# Patient Record
Sex: FEMALE | Race: BLACK OR AFRICAN AMERICAN | NOT HISPANIC OR LATINO | Employment: UNEMPLOYED | URBAN - METROPOLITAN AREA
[De-identification: names, ages, dates, MRNs, and addresses within clinical notes are randomized per-mention and may not be internally consistent; named-entity substitution may affect disease eponyms.]

---

## 2018-02-21 ENCOUNTER — OFFICE VISIT (OUTPATIENT)
Dept: OBSTETRICS AND GYNECOLOGY | Facility: CLINIC | Age: 38
End: 2018-02-21

## 2018-02-21 VITALS
WEIGHT: 122 LBS | SYSTOLIC BLOOD PRESSURE: 128 MMHG | DIASTOLIC BLOOD PRESSURE: 91 MMHG | HEIGHT: 67 IN | HEART RATE: 68 BPM | BODY MASS INDEX: 19.15 KG/M2

## 2018-02-21 DIAGNOSIS — E04.9 GOITER: ICD-10-CM

## 2018-02-21 DIAGNOSIS — Z72.0 TOBACCO USE: ICD-10-CM

## 2018-02-21 DIAGNOSIS — Z01.419 ENCOUNTER FOR GYNECOLOGICAL EXAMINATION WITHOUT ABNORMAL FINDING: Primary | ICD-10-CM

## 2018-02-21 PROCEDURE — 99406 BEHAV CHNG SMOKING 3-10 MIN: CPT | Performed by: OBSTETRICS & GYNECOLOGY

## 2018-02-21 PROCEDURE — 99213 OFFICE O/P EST LOW 20 MIN: CPT | Performed by: OBSTETRICS & GYNECOLOGY

## 2018-02-21 PROCEDURE — 99395 PREV VISIT EST AGE 18-39: CPT | Performed by: OBSTETRICS & GYNECOLOGY

## 2018-02-21 RX ORDER — ALBUTEROL SULFATE 90 UG/1
2 AEROSOL, METERED RESPIRATORY (INHALATION)
COMMUNITY
End: 2019-05-20

## 2018-02-21 RX ORDER — AMLODIPINE BESYLATE 10 MG/1
TABLET ORAL
Refills: 3 | COMMUNITY
Start: 2018-01-11 | End: 2019-05-20

## 2018-02-21 RX ORDER — ZOLPIDEM TARTRATE 10 MG/1
10 TABLET ORAL
COMMUNITY
End: 2019-05-20

## 2018-02-21 RX ORDER — IBUPROFEN 800 MG/1
800 TABLET ORAL
COMMUNITY
Start: 2017-10-30 | End: 2018-10-03

## 2018-02-21 RX ORDER — DULOXETIN HYDROCHLORIDE 20 MG/1
CAPSULE, DELAYED RELEASE ORAL
Refills: 2 | COMMUNITY
Start: 2018-01-09 | End: 2019-05-20

## 2018-02-21 RX ORDER — OMEPRAZOLE 40 MG/1
CAPSULE, DELAYED RELEASE ORAL
Refills: 0 | COMMUNITY
Start: 2018-01-30 | End: 2019-05-20

## 2018-02-21 RX ORDER — OMEPRAZOLE 20 MG/1
20 CAPSULE, DELAYED RELEASE ORAL
COMMUNITY
End: 2018-02-21

## 2018-02-21 RX ORDER — ATORVASTATIN CALCIUM 20 MG/1
20 TABLET, FILM COATED ORAL
COMMUNITY
End: 2019-05-20

## 2018-02-21 RX ORDER — BUPROPION HYDROCHLORIDE 150 MG/1
150 TABLET, EXTENDED RELEASE ORAL
COMMUNITY
End: 2020-10-14

## 2018-02-21 RX ORDER — BUDESONIDE AND FORMOTEROL FUMARATE DIHYDRATE 160; 4.5 UG/1; UG/1
2 AEROSOL RESPIRATORY (INHALATION)
COMMUNITY
Start: 2015-12-21 | End: 2019-06-04

## 2018-02-21 RX ORDER — CYCLOBENZAPRINE HCL 10 MG
10 TABLET ORAL AS NEEDED
COMMUNITY
Start: 2017-10-30 | End: 2020-04-29

## 2018-02-21 RX ORDER — LISINOPRIL 40 MG/1
TABLET ORAL
Refills: 3 | COMMUNITY
Start: 2018-01-11 | End: 2019-05-20

## 2018-02-21 RX ORDER — DICYCLOMINE HYDROCHLORIDE 10 MG/1
CAPSULE ORAL
Refills: 2 | COMMUNITY
Start: 2018-01-11 | End: 2019-05-20

## 2018-02-21 RX ORDER — MONTELUKAST SODIUM 10 MG/1
10 TABLET ORAL
COMMUNITY
End: 2018-04-18 | Stop reason: SDUPTHER

## 2018-02-21 RX ORDER — MULTIVIT-MIN/IRON/FOLIC ACID/K 18-600-40
CAPSULE ORAL
COMMUNITY
End: 2018-10-03 | Stop reason: SDUPTHER

## 2018-02-21 NOTE — PATIENT INSTRUCTIONS
Steps to Quit Smoking  Smoking tobacco can be harmful to your health and can affect almost every organ in your body. Smoking puts you, and those around you, at risk for developing many serious chronic diseases. Quitting smoking is difficult, but it is one of the best things that you can do for your health. It is never too late to quit.  What are the benefits of quitting smoking?  When you quit smoking, you lower your risk of developing serious diseases and conditions, such as:  · Lung cancer or lung disease, such as COPD.  · Heart disease.  · Stroke.  · Heart attack.  · Infertility.  · Osteoporosis and bone fractures.  Additionally, symptoms such as coughing, wheezing, and shortness of breath may get better when you quit. You may also find that you get sick less often because your body is stronger at fighting off colds and infections. If you are pregnant, quitting smoking can help to reduce your chances of having a baby of low birth weight.  How do I get ready to quit?  When you decide to quit smoking, create a plan to make sure that you are successful. Before you quit:  · Pick a date to quit. Set a date within the next two weeks to give you time to prepare.  · Write down the reasons why you are quitting. Keep this list in places where you will see it often, such as on your bathroom mirror or in your car or wallet.  · Identify the people, places, things, and activities that make you want to smoke (triggers) and avoid them. Make sure to take these actions:  ¨ Throw away all cigarettes at home, at work, and in your car.  ¨ Throw away smoking accessories, such as ashtrays and lighters.  ¨ Clean your car and make sure to empty the ashtray.  ¨ Clean your home, including curtains and carpets.  · Tell your family, friends, and coworkers that you are quitting. Support from your loved ones can make quitting easier.  · Talk with your health care provider about your options for quitting smoking.  · Find out what treatment  options are covered by your health insurance.  What strategies can I use to quit smoking?  Talk with your healthcare provider about different strategies to quit smoking. Some strategies include:  · Quitting smoking altogether instead of gradually lessening how much you smoke over a period of time. Research shows that quitting “cold turkey” is more successful than gradually quitting.  · Attending in-person counseling to help you build problem-solving skills. You are more likely to have success in quitting if you attend several counseling sessions. Even short sessions of 10 minutes can be effective.  · Finding resources and support systems that can help you to quit smoking and remain smoke-free after you quit. These resources are most helpful when you use them often. They can include:  ¨ Online chats with a counselor.  ¨ Telephone quitlines.  ¨ Printed self-help materials.  ¨ Support groups or group counseling.  ¨ Text messaging programs.  ¨ Mobile phone applications.  · Taking medicines to help you quit smoking. (If you are pregnant or breastfeeding, talk with your health care provider first.) Some medicines contain nicotine and some do not. Both types of medicines help with cravings, but the medicines that include nicotine help to relieve withdrawal symptoms. Your health care provider may recommend:  ¨ Nicotine patches, gum, or lozenges.  ¨ Nicotine inhalers or sprays.  ¨ Non-nicotine medicine that is taken by mouth.  Talk with your health care provider about combining strategies, such as taking medicines while you are also receiving in-person counseling. Using these two strategies together makes you more likely to succeed in quitting than if you used either strategy on its own.  If you are pregnant or breastfeeding, talk with your health care provider about finding counseling or other support strategies to quit smoking. Do not take medicine to help you quit smoking unless told to do so by your health care  provider.  What things can I do to make it easier to quit?  Quitting smoking might feel overwhelming at first, but there is a lot that you can do to make it easier. Take these important actions:  · Reach out to your family and friends and ask that they support and encourage you during this time. Call telephone quitlines, reach out to support groups, or work with a counselor for support.  · Ask people who smoke to avoid smoking around you.  · Avoid places that trigger you to smoke, such as bars, parties, or smoke-break areas at work.  · Spend time around people who do not smoke.  · Lessen stress in your life, because stress can be a smoking trigger for some people. To lessen stress, try:  ¨ Exercising regularly.  ¨ Deep-breathing exercises.  ¨ Yoga.  ¨ Meditating.  ¨ Performing a body scan. This involves closing your eyes, scanning your body from head to toe, and noticing which parts of your body are particularly tense. Purposefully relax the muscles in those areas.  · Download or purchase mobile phone or tablet apps (applications) that can help you stick to your quit plan by providing reminders, tips, and encouragement. There are many free apps, such as QuitGuide from the CDC (Centers for Disease Control and Prevention). You can find other support for quitting smoking (smoking cessation) through smokefree.gov and other websites.  How will I feel when I quit smoking?  Within the first 24 hours of quitting smoking, you may start to feel some withdrawal symptoms. These symptoms are usually most noticeable 2-3 days after quitting, but they usually do not last beyond 2-3 weeks. Changes or symptoms that you might experience include:  · Mood swings.  · Restlessness, anxiety, or irritation.  · Difficulty concentrating.  · Dizziness.  · Strong cravings for sugary foods in addition to nicotine.  · Mild weight gain.  · Constipation.  · Nausea.  · Coughing or a sore throat.  · Changes in how your medicines work in your  body.  · A depressed mood.  · Difficulty sleeping (insomnia).  After the first 2-3 weeks of quitting, you may start to notice more positive results, such as:  · Improved sense of smell and taste.  · Decreased coughing and sore throat.  · Slower heart rate.  · Lower blood pressure.  · Clearer skin.  · The ability to breathe more easily.  · Fewer sick days.  Quitting smoking is very challenging for most people. Do not get discouraged if you are not successful the first time. Some people need to make many attempts to quit before they achieve long-term success. Do your best to stick to your quit plan, and talk with your health care provider if you have any questions or concerns.  This information is not intended to replace advice given to you by your health care provider. Make sure you discuss any questions you have with your health care provider.  Document Released: 12/12/2002 Document Revised: 08/15/2017 Document Reviewed: 05/03/2016  [employer] Interactive Patient Education © 2017 [employer]

## 2018-02-21 NOTE — PROGRESS NOTES
GYN Annual Exam     CC- Here for annual exam.   Also concerned with swelling in throat     Belem Perez is a 37 y.o. female who presents for annual well woman exam. Periods are regular every 28-30 days, lasting 7 days. Dysmenorrhea:severe, occurring first 1-2 days of flow. Cyclic symptoms include headache and nausea and vomiting. . No intermenstrual bleeding, spotting, or discharge.    Noted swelling in her throat that has slowly worsened in the last few years.      OB History      Para Term  AB Living    2 2    2    SAB TAB Ectopic Multiple Live Births                  Current contraception: tubal ligation  History of abnormal Pap smear: yes - no treatment required.   Family history of uterine, colon or ovarian cancer: no  History of abnormal mammogram: no  Family history of breast cancer: no  Last Pap : 2014 NL HPV-    Past Medical History:   Diagnosis Date   • Asthma    • Bipolar disorder    • GERD (gastroesophageal reflux disease)    • Hyperlipidemia    • Hypertension        Past Surgical History:   Procedure Laterality Date   • APPENDECTOMY     • TUBAL ABDOMINAL LIGATION           Current Outpatient Prescriptions:   •  albuterol (PROVENTIL HFA;VENTOLIN HFA) 108 (90 Base) MCG/ACT inhaler, Inhale 2 puffs., Disp: , Rfl:   •  amLODIPine (NORVASC) 10 MG tablet, TK 1 T PO  D, Disp: , Rfl: 3  •  atorvastatin (LIPITOR) 20 MG tablet, Take 20 mg by mouth., Disp: , Rfl:   •  budesonide-formoterol (SYMBICORT) 160-4.5 MCG/ACT inhaler, Inhale 2 puffs., Disp: , Rfl:   •  buPROPion SR (WELLBUTRIN SR) 150 MG 12 hr tablet, Take 150 mg by mouth., Disp: , Rfl:   •  Cholecalciferol (VITAMIN D) 2000 units capsule, Take  by mouth., Disp: , Rfl:   •  cyclobenzaprine (FLEXERIL) 10 MG tablet, Take 10 mg by mouth., Disp: , Rfl:   •  dicyclomine (BENTYL) 10 MG capsule, TK ONE C PO  D, Disp: , Rfl: 2  •  DULoxetine (CYMBALTA) 20 MG capsule, TK 1 C PO D, Disp: , Rfl: 2  •  ibuprofen (ADVIL,MOTRIN) 800 MG tablet, Take 800  "mg by mouth., Disp: , Rfl:   •  lisinopril (PRINIVIL,ZESTRIL) 40 MG tablet, TK 1 T PO D, Disp: , Rfl: 3  •  montelukast (SINGULAIR) 10 MG tablet, Take 10 mg by mouth., Disp: , Rfl:   •  omeprazole (priLOSEC) 40 MG capsule, TK 1 C PO QD, Disp: , Rfl: 0  •  tiotropium (SPIRIVA HANDIHALER) 18 MCG per inhalation capsule, Place 2 puffs into inhaler and inhale., Disp: , Rfl:   •  zolpidem (AMBIEN) 10 MG tablet, Take 10 mg by mouth., Disp: , Rfl:     Allergies   Allergen Reactions   • Allyl Isothiocyanate Hives   • Grape (Artificial) Flavor Hives   • Antihistamines, Diphenhydramine-Type Itching and Rash     Benadryl cream   • Latex Rash       Social History   Substance Use Topics   • Smoking status: Current Every Day Smoker     Types: Cigars   • Smokeless tobacco: Never Used   • Alcohol use Yes      Comment: rarely       Family History   Problem Relation Age of Onset   • Colon cancer Maternal Aunt    • Colon cancer Maternal Uncle    • Breast cancer Neg Hx    • Ovarian cancer Neg Hx    • Uterine cancer Neg Hx    • Deep vein thrombosis Neg Hx    • Pulmonary embolism Neg Hx        Review of Systems   Constitutional: Positive for unexpected weight change. Negative for chills and fever.   Gastrointestinal: Positive for diarrhea and nausea. Negative for abdominal pain, constipation and vomiting.   Genitourinary: Negative for dysuria, menstrual problem, pelvic pain, vaginal bleeding and vaginal discharge.   All other systems reviewed and are negative.      /91  Pulse 68  Ht 170.2 cm (67\")  Wt 55.3 kg (122 lb)  LMP 02/02/2018 (Exact Date)  Breastfeeding? No  BMI 19.11 kg/m2    Physical Exam   Constitutional: She is oriented to person, place, and time. She appears well-developed and well-nourished. No distress.   HENT:   Head: Normocephalic and atraumatic.   Eyes: Conjunctivae are normal.   Neck: Normal range of motion. Neck supple. Thyromegaly (right sided goiter ) present.   Cardiovascular: Normal rate and regular " rhythm.    No murmur heard.  Pulmonary/Chest: Effort normal and breath sounds normal. Right breast exhibits no inverted nipple, no mass and no nipple discharge. Left breast exhibits no inverted nipple, no mass and no nipple discharge.   Abdominal: Soft. Bowel sounds are normal. She exhibits no distension. There is no tenderness.   Genitourinary: Vagina normal and uterus normal. Pelvic exam was performed with patient supine. There is no lesion on the right labia. There is no lesion on the left labia. Uterus is not enlarged (anteverted ), not fixed and not tender. Cervix exhibits no motion tenderness. Right adnexum displays no mass and no tenderness. Left adnexum displays no mass and no tenderness. No bleeding in the vagina. No vaginal discharge found.   Musculoskeletal: She exhibits no edema.   Lymphadenopathy:        Right: No inguinal adenopathy present.        Left: No inguinal adenopathy present.   Neurological: She is alert and oriented to person, place, and time.   Skin: No rash noted.   Psychiatric: She has a normal mood and affect. Her behavior is normal.          Assessment     1) GYN annual well woman exam.   2) Goiter -  Hx of hyperthyroidism   Check thyroid panel   Check thyroid ultrasound   Follow up with PCP for ENT referral   3) Tobacco     Tobacco abuse addressed    1) Increases risk for heart disease, COPD and lung cancer   2) many ways to stop including hypnosis, cold turkey, weaning but if interested in   Patches, nicotine gum, or medications like zyban and Chantix will need to see PCP  3) Recommend weaning as ideal way to try and reduce risk to stop   Typically encourage to set goals every two weeks with a reduction by 50% in use of tobacco. Once down to a few a day, set quit day in the same manor.   4) [place] quit now is a resource available to all.     I spent > 3 min face to face counseling about the use of tobacco abuse and why to quit.           Plan     1) Breast Health - Clinical breast exam  yearly, Self breast awareness monthly  2) Pap - updated today   3) Smoking status- smoking cessation encouraged  4) Seat belts recommended  5) Follow up prn and one year.     Александр Vale MD   2/21/2018  3:50 PM

## 2018-02-22 ENCOUNTER — TELEPHONE (OUTPATIENT)
Dept: OBSTETRICS AND GYNECOLOGY | Facility: CLINIC | Age: 38
End: 2018-02-22

## 2018-02-22 LAB
FT4I SERPL CALC-MCNC: 1.9 (ref 1.2–4.9)
T3RU NFR SERPL: 26 % (ref 24–39)
T4 SERPL-MCNC: 7.4 UG/DL (ref 4.5–12)
TSH SERPL DL<=0.005 MIU/L-ACNC: 0.42 UIU/ML (ref 0.45–4.5)

## 2018-02-22 NOTE — TELEPHONE ENCOUNTER
Spoke to pt.     She is aware.    Celine Christie- Can you tell me whats going on with her appt for US?    Thanks  Celine

## 2018-02-22 NOTE — TELEPHONE ENCOUNTER
----- Message from Александр Vale MD sent at 2/22/2018 12:25 PM EST -----  Celine, essentially normal thyroid testing, so not over/under active, but still enlarged, still needs ultrasound and referral to PCP - Thanks Dr. Vale

## 2018-02-22 NOTE — TELEPHONE ENCOUNTER
Attempted call to pt, voicemail not set up, unable to l/m.  Called to inform pt to call number on insurance card for list of PCP's in her network, to establish care.

## 2018-02-26 ENCOUNTER — TELEPHONE (OUTPATIENT)
Dept: OBSTETRICS AND GYNECOLOGY | Facility: CLINIC | Age: 38
End: 2018-02-26

## 2018-02-26 LAB
CYTOLOGIST CVX/VAG CYTO: ABNORMAL
CYTOLOGY CVX/VAG DOC THIN PREP: ABNORMAL
DX ICD CODE: ABNORMAL
DX ICD CODE: ABNORMAL
HIV 1 & 2 AB SER-IMP: ABNORMAL
HPV I/H RISK 1 DNA CVX QL PROBE+SIG AMP: POSITIVE
OTHER STN SPEC: ABNORMAL
PATH REPORT.FINAL DX SPEC: ABNORMAL
PATHOLOGIST CVX/VAG CYTO: ABNORMAL
RECOM F/U CVX/VAG CYTO: ABNORMAL
STAT OF ADQ CVX/VAG CYTO-IMP: ABNORMAL

## 2018-02-28 ENCOUNTER — TELEPHONE (OUTPATIENT)
Dept: OBSTETRICS AND GYNECOLOGY | Facility: CLINIC | Age: 38
End: 2018-02-28

## 2018-02-28 NOTE — TELEPHONE ENCOUNTER
Attempted call to pt, no answer, no voicemail.    Pt scheduled for US Thyroid @ [facility] on 3/6/18 @ 4:00, 3:45 arrival, no prep necessary.

## 2018-03-01 ENCOUNTER — TELEPHONE (OUTPATIENT)
Dept: OBSTETRICS AND GYNECOLOGY | Facility: CLINIC | Age: 38
End: 2018-03-01

## 2018-03-02 ENCOUNTER — TELEPHONE (OUTPATIENT)
Dept: OBSTETRICS AND GYNECOLOGY | Facility: CLINIC | Age: 38
End: 2018-03-02

## 2018-03-02 NOTE — TELEPHONE ENCOUNTER
----- Message from Александр Vale MD sent at 3/2/2018  9:04 AM EST -----  Celine, She is aware. Needs to follow up for colpo due to ascus HPV +. Thanks Dr. Vale

## 2018-03-06 ENCOUNTER — HOSPITAL ENCOUNTER (OUTPATIENT)
Dept: ULTRASOUND IMAGING | Facility: HOSPITAL | Age: 38
Discharge: HOME | End: 2018-03-06
Attending: OBSTETRICS & GYNECOLOGY | Admitting: OBSTETRICS & GYNECOLOGY

## 2018-03-06 PROCEDURE — 76536 US EXAM OF HEAD AND NECK: CPT

## 2018-03-08 ENCOUNTER — TELEPHONE (OUTPATIENT)
Dept: OBSTETRICS AND GYNECOLOGY | Facility: CLINIC | Age: 38
End: 2018-03-08

## 2018-04-18 ENCOUNTER — PROCEDURE VISIT (OUTPATIENT)
Dept: OBSTETRICS AND GYNECOLOGY | Facility: CLINIC | Age: 38
End: 2018-04-18

## 2018-04-18 VITALS — BODY MASS INDEX: 18.52 KG/M2 | HEIGHT: 67 IN | WEIGHT: 118 LBS

## 2018-04-18 DIAGNOSIS — R87.610 ASCUS WITH POSITIVE HIGH RISK HPV CERVICAL: Primary | ICD-10-CM

## 2018-04-18 DIAGNOSIS — R87.810 ASCUS WITH POSITIVE HIGH RISK HPV CERVICAL: Primary | ICD-10-CM

## 2018-04-18 PROCEDURE — 57455 BIOPSY OF CERVIX W/SCOPE: CPT | Performed by: OBSTETRICS & GYNECOLOGY

## 2018-04-18 RX ORDER — CHOLECALCIFEROL (VITAMIN D3) 125 MCG
CAPSULE ORAL
Refills: 0 | COMMUNITY
Start: 2018-04-05 | End: 2019-05-20

## 2018-04-18 NOTE — PROGRESS NOTES
Colposcopy Procedure Note    Indications: Most recent Pap smear showed: ASCUS with POSITIVE high risk HPV.  Prior cervical/vaginal disease: HPV related changes.  Prior cervical treatment: no treatment.    Procedure Details   The risks and benefits of the procedure and Verbal informed consent obtained.    Speculum placed in vagina and excellent visualization of cervix achieved, cervix swabbed x 3 with acetic acid solution and Lugol's solution     Findings:  Cervix: no visible lesions, no mosaicism, no punctation, no abnormal vasculature and acetowhite lesion(s) noted at 3 o'clock; SCJ visualized - lesion at 3 o'clock, cervical biopsies taken at 3 o'clock, specimen labelled and sent to pathology and hemostasis achieved with Monsel's solution. Declined lugol's because she felt it burned her vagina for years in the past.   Vaginal inspection: vaginal colposcopy not performed.  Vulvar colposcopy: vulvar colposcopy not performed.    Specimens: cervical biopsy     Complications: none.    Plan:  Specimens labelled and sent to Pathology.  Will base further treatment on Pathology findings.  Post biopsy instructions given to patient.    Александр Vale MD  4/18/2018  2:07 PM

## 2018-04-23 ENCOUNTER — TELEPHONE (OUTPATIENT)
Dept: OBSTETRICS AND GYNECOLOGY | Facility: CLINIC | Age: 38
End: 2018-04-23

## 2018-04-23 LAB
DX ICD CODE: NORMAL
DX ICD CODE: NORMAL
PATH REPORT.FINAL DX SPEC: NORMAL
PATH REPORT.GROSS SPEC: NORMAL
PATH REPORT.SITE OF ORIGIN SPEC: NORMAL
PATHOLOGIST NAME: NORMAL
PAYMENT PROCEDURE: NORMAL

## 2018-04-23 NOTE — TELEPHONE ENCOUNTER
Dr. Vale,    Pt has questions in regards to her results. Please call her @ [phone].    Thanks  Celine    Returned call and left message    Александр Vale MD  4/23/2018  4:28 PM

## 2018-04-24 NOTE — TELEPHONE ENCOUNTER
Returned her call.     Concerned she has had vagianal pain since colposcopy. Explained we only did acetic acid solution, due to her prior concern with dyes causing burning. But she also has been having pelvic pain and pain with intercourse for awhile, so need to schedule an appointment to review and decide on her treatment.     Александр Vale MD  4/24/2018  4:28 PM

## 2018-08-27 ENCOUNTER — TRANSCRIBE ORDERS (OUTPATIENT)
Dept: ADMINISTRATIVE | Facility: HOSPITAL | Age: 38
End: 2018-08-27

## 2018-08-27 DIAGNOSIS — E05.90 HYPERTHYROIDISM: Primary | ICD-10-CM

## 2018-09-04 ENCOUNTER — HOSPITAL ENCOUNTER (OUTPATIENT)
Dept: NUCLEAR MEDICINE | Facility: HOSPITAL | Age: 38
Discharge: HOME | End: 2018-09-04

## 2018-09-04 DIAGNOSIS — E05.90 HYPERTHYROIDISM: ICD-10-CM

## 2018-09-04 PROCEDURE — A9516 IODINE I-123 SOD IODIDE MIC: HCPCS | Performed by: INTERNAL MEDICINE

## 2018-09-04 PROCEDURE — 0 SODIUM IODIDE 3.7 MBQ CAPSULE: Performed by: INTERNAL MEDICINE

## 2018-09-04 RX ORDER — SODIUM IODIDE I 123 100 UCI/1
1 CAPSULE, GELATIN COATED ORAL
Status: COMPLETED | OUTPATIENT
Start: 2018-09-04 | End: 2018-09-04

## 2018-09-04 RX ADMIN — Medication 1 CAPSULE: at 14:40

## 2018-09-05 ENCOUNTER — HOSPITAL ENCOUNTER (OUTPATIENT)
Dept: NUCLEAR MEDICINE | Facility: HOSPITAL | Age: 38
Discharge: HOME | End: 2018-09-05

## 2018-09-05 PROCEDURE — 78014 THYROID IMAGING W/BLOOD FLOW: CPT

## 2018-10-03 ENCOUNTER — OFFICE VISIT (OUTPATIENT)
Dept: GASTROENTEROLOGY | Facility: CLINIC | Age: 38
End: 2018-10-03

## 2018-10-03 VITALS
BODY MASS INDEX: 17.71 KG/M2 | DIASTOLIC BLOOD PRESSURE: 92 MMHG | TEMPERATURE: 98.4 F | HEIGHT: 67 IN | WEIGHT: 112.8 LBS | SYSTOLIC BLOOD PRESSURE: 146 MMHG

## 2018-10-03 DIAGNOSIS — G89.29 CHRONIC RLQ PAIN: Primary | ICD-10-CM

## 2018-10-03 DIAGNOSIS — R10.31 CHRONIC RLQ PAIN: Primary | ICD-10-CM

## 2018-10-03 PROCEDURE — 99203 OFFICE O/P NEW LOW 30 MIN: CPT | Performed by: INTERNAL MEDICINE

## 2018-10-03 RX ORDER — CETIRIZINE HYDROCHLORIDE 10 MG/1
10 TABLET ORAL DAILY
Refills: 1 | COMMUNITY
Start: 2018-07-19 | End: 2019-05-20

## 2018-10-03 RX ORDER — MONTELUKAST SODIUM 10 MG/1
10 TABLET ORAL DAILY
Refills: 1 | COMMUNITY
Start: 2018-08-15 | End: 2019-05-20

## 2018-10-03 RX ORDER — METOPROLOL SUCCINATE 25 MG/1
25 TABLET, EXTENDED RELEASE ORAL DAILY
Refills: 1 | COMMUNITY
Start: 2018-07-23 | End: 2019-05-20

## 2018-10-03 NOTE — PROGRESS NOTES
Chief Complaint   Patient presents with   • Abdominal Pain     X 2 years     Belem Perez is a 38 y.o. female who presents with rlq pain.  This pain has been present x 2 years since her appy during last pregnancy.  Worse with sexual intercourse.  Has sharp pain in the area prior to BM.  Sometimes radiates to her shoulder.  She has had diarrhea lately for about a few months.  She sees a little BRB intermittently in her stool.  Her weight has decreased 27 lbs without intention - recently dx with Graves disease.  She has tried bentyl but it didn't really help.      Noncontrast CT 1/18 - no cause for her pain found.  She also had a previous noncontrast CT that showed no significant abnormality.  She had EGD and colonoscopy 8/17 performed by Dr. Jermaine Ascencio-EGD with mild gastritis, normal colonoscopy-no biopsies taken.    She reports that she has had increased pain since she had a procedure done due to an abnormal Pap performed by Dr. Vale - she does note that she has abnormal vaginal discharge.  She has not contacted him at this point.  HPI  Past Medical History:   Diagnosis Date   • Asthma    • Bipolar disorder (CMS/HCC)    • GERD (gastroesophageal reflux disease)    • Graves disease    • Hyperlipidemia    • Hypertension      Past Surgical History:   Procedure Laterality Date   • APPENDECTOMY     • TUBAL ABDOMINAL LIGATION         Current Outpatient Prescriptions:   •  albuterol (PROVENTIL HFA;VENTOLIN HFA) 108 (90 Base) MCG/ACT inhaler, Inhale 2 puffs., Disp: , Rfl:   •  amLODIPine (NORVASC) 10 MG tablet, TK 1 T PO  D, Disp: , Rfl: 3  •  budesonide-formoterol (SYMBICORT) 160-4.5 MCG/ACT inhaler, Inhale 2 puffs., Disp: , Rfl:   •  buPROPion SR (WELLBUTRIN SR) 150 MG 12 hr tablet, Take 150 mg by mouth., Disp: , Rfl:   •  cetirizine (zyrTEC) 10 MG tablet, Take 10 mg by mouth Daily., Disp: , Rfl: 1  •  Cholecalciferol (VITAMIN D3) 2000 units tablet, TK 1 T PO DAILY, Disp: , Rfl: 0  •  cyclobenzaprine (FLEXERIL) 10 MG  tablet, Take 10 mg by mouth., Disp: , Rfl:   •  dicyclomine (BENTYL) 10 MG capsule, TK ONE C PO  D, Disp: , Rfl: 2  •  DULoxetine (CYMBALTA) 20 MG capsule, TK 1 C PO D, Disp: , Rfl: 2  •  Fluticasone Furoate-Vilanterol (BREO ELLIPTA) 200-25 MCG/INH aerosol powder , Inhale Daily., Disp: , Rfl:   •  Fluticasone-Salmeterol (ADVAIR HFA IN), Inhale Daily., Disp: , Rfl:   •  lisinopril (PRINIVIL,ZESTRIL) 40 MG tablet, TK 1 T PO D, Disp: , Rfl: 3  •  metoprolol succinate XL (TOPROL-XL) 25 MG 24 hr tablet, Take 25 mg by mouth Daily., Disp: , Rfl: 1  •  montelukast (SINGULAIR) 10 MG tablet, Take 10 mg by mouth Daily., Disp: , Rfl: 1  •  omeprazole (priLOSEC) 40 MG capsule, TK 1 C PO QD, Disp: , Rfl: 0  •  tiotropium (SPIRIVA HANDIHALER) 18 MCG per inhalation capsule, Place 2 puffs into inhaler and inhale., Disp: , Rfl:   •  zolpidem (AMBIEN) 10 MG tablet, Take 10 mg by mouth., Disp: , Rfl:   •  atorvastatin (LIPITOR) 20 MG tablet, Take 20 mg by mouth., Disp: , Rfl:   Allergies   Allergen Reactions   • Allyl Isothiocyanate Hives   • Grape (Artificial) Flavor Hives   • Antihistamines, Diphenhydramine-Type Itching and Rash     Benadryl cream   • Latex Rash     Social History     Social History   • Marital status: Unknown     Spouse name: N/A   • Number of children: N/A   • Years of education: N/A     Occupational History   • Not on file.     Social History Main Topics   • Smoking status: Current Every Day Smoker     Types: Cigars   • Smokeless tobacco: Never Used   • Alcohol use Yes      Comment: rarely   • Drug use: No   • Sexual activity: Not Currently     Partners: Male     Birth control/ protection: Surgical     Other Topics Concern   • Not on file     Social History Narrative   • No narrative on file     Family History   Problem Relation Age of Onset   • Colon cancer Maternal Aunt    • Colon cancer Maternal Uncle    • Breast cancer Neg Hx    • Ovarian cancer Neg Hx    • Uterine cancer Neg Hx    • Deep vein thrombosis Neg  Hx    • Pulmonary embolism Neg Hx      Review of Systems   Constitutional: Negative for appetite change and unexpected weight change.   Gastrointestinal: Positive for abdominal pain and diarrhea. Negative for abdominal distention and blood in stool.   Genitourinary: Positive for dyspareunia and vaginal discharge.   All other systems reviewed and are negative.    Vitals:    10/03/18 1509   BP: 146/92   Temp: 98.4 °F (36.9 °C)     1    10/03/18  1509   Weight: 51.2 kg (112 lb 12.8 oz)     Physical Exam   Constitutional: She appears well-developed and well-nourished.   HENT:   Head: Normocephalic and atraumatic.   Eyes: No scleral icterus.   Abdominal: Soft. She exhibits no distension and no mass. There is no tenderness.   Neurological: She is alert.   Skin: Skin is warm and dry.   Psychiatric: She has a normal mood and affect.     No images are attached to the encounter.  No notes on file  Belem was seen today for abdominal pain.    Diagnoses and all orders for this visit:    Chronic RLQ pain  -     CT Abdomen Pelvis With Contrast; Future    Plan-  Recommend contrasted CT to further evaluate her discomfort as I cannot find a contrasted study in her previous records    Question whether how much of this diarrhea is related to her new diagnosis of Graves' disease-she has not yet started any medication or therapy for this    Recommend follow-up with her gynecologist to discuss and workup her vaginal discharge and pain with intercourse    Further recommendations based upon the results of CT

## 2018-10-08 ENCOUNTER — OFFICE VISIT (OUTPATIENT)
Dept: OBSTETRICS AND GYNECOLOGY | Facility: CLINIC | Age: 38
End: 2018-10-08

## 2018-10-08 VITALS
BODY MASS INDEX: 17.74 KG/M2 | SYSTOLIC BLOOD PRESSURE: 153 MMHG | HEART RATE: 74 BPM | HEIGHT: 67 IN | DIASTOLIC BLOOD PRESSURE: 107 MMHG | WEIGHT: 113 LBS

## 2018-10-08 DIAGNOSIS — R10.31 CHRONIC RLQ PAIN: Primary | ICD-10-CM

## 2018-10-08 DIAGNOSIS — Z30.014 ENCOUNTER FOR INITIAL PRESCRIPTION OF INTRAUTERINE CONTRACEPTIVE DEVICE (IUD): ICD-10-CM

## 2018-10-08 DIAGNOSIS — G89.29 CHRONIC RLQ PAIN: Primary | ICD-10-CM

## 2018-10-08 PROCEDURE — 99214 OFFICE O/P EST MOD 30 MIN: CPT | Performed by: OBSTETRICS & GYNECOLOGY

## 2018-10-08 RX ORDER — MULTIVITAMIN WITH IRON
TABLET ORAL DAILY
Refills: 1 | COMMUNITY
Start: 2018-09-25 | End: 2019-05-20

## 2018-10-08 NOTE — PROGRESS NOTES
"Subjective   Belem GUAJARDO Love is a 38 y.o. female c/o abdominal pain. Pain has been present since last office visit in 2018. Pain comes and goes, eating sooths the pain, when not eating, she feels nauseous. Pain feels like a throbbing sensation.  Pt was recently diagnosed with Graves disease and Thyroid disease.   Pt also c/o pain with intercourse and thick white vaginal d/c.    History of Present Illness     38 y.o.    Pain in Right lower quadrant for last several months () - Intermittent - stable for these last few months   Worse with intercourse, or on her menstrual cycle   Alleviated by eating   Associated symptoms Nausea     Cycles 28-30 - 9 days of bleeding, + clots   Not currently on contraception     Note recent history of graves disease   Still awaiting treatment       The following portions of the patient's history were reviewed and updated as appropriate: allergies, current medications, past family history, past medical history, past social history, past surgical history and problem list.    Review of Systems   Constitutional: Negative for chills, fever and unexpected weight loss.   Gastrointestinal: Positive for abdominal pain and nausea. Negative for constipation, diarrhea and vomiting.   Genitourinary: Positive for dyspareunia and menstrual problem. Negative for dysuria, pelvic pain, vaginal bleeding and vaginal discharge.   All other systems reviewed and are negative.      Objective    BP (!) 153/107   Pulse 74   Ht 170.2 cm (67\")   Wt 51.3 kg (113 lb)   LMP 2018 (Exact Date)   Breastfeeding? No   BMI 17.70 kg/m²   Physical Exam   Constitutional: She appears well-developed and well-nourished. No distress.   HENT:   Head: Normocephalic and atraumatic.   Abdominal: Soft. Bowel sounds are normal. She exhibits no distension. There is no tenderness.   Genitourinary: Vagina normal. Pelvic exam was performed with patient supine. There is no lesion or Bartholin's cyst on the " right labia. There is no lesion or Bartholin's cyst on the left labia. Uterus is tender and anteverted. Uterus is not deviated, enlarged, fixed or exhibiting a mass.   Cervix is parous. Right adnexum displays no mass, no tenderness and no fullness. Left adnexum displays no mass, no tenderness and no fullness. No bleeding in the vagina. No vaginal discharge found.   Musculoskeletal: She exhibits no edema.   Lymphadenopathy:        Right: No inguinal adenopathy present.        Left: No inguinal adenopathy present.   Skin: Skin is warm and dry.   Psychiatric: She has a normal mood and affect. Her behavior is normal.         Assessment/Plan   Problems Addressed this Visit     None      Visit Diagnoses     Chronic RLQ pain    -  Primary    Relevant Orders    NuSwab VG+ - Swab, Vagina    Urine Culture - Urine, Urine, Clean Catch    Encounter for initial prescription of intrauterine contraceptive device (IUD)              1) RLQ pain   Normal exam   Suspect some effect of the thyroid disease   Will check cultures (Vaginal and urine) along with ultrasound   If normal consider Mirena to try and suppress whatever is going on     2) IUD   Discussed pros and cons  Will investigate and return to place once ready     Александр Vale MD  10/8/2018  1:33 PM

## 2018-10-09 ENCOUNTER — PROCEDURE VISIT (OUTPATIENT)
Dept: OBSTETRICS AND GYNECOLOGY | Facility: CLINIC | Age: 38
End: 2018-10-09

## 2018-10-09 DIAGNOSIS — R10.2 PELVIC PAIN IN FEMALE: Primary | ICD-10-CM

## 2018-10-09 PROCEDURE — 76830 TRANSVAGINAL US NON-OB: CPT | Performed by: OBSTETRICS & GYNECOLOGY

## 2018-10-10 ENCOUNTER — TELEPHONE (OUTPATIENT)
Dept: OBSTETRICS AND GYNECOLOGY | Facility: CLINIC | Age: 38
End: 2018-10-10

## 2018-10-10 LAB
BACTERIA UR CULT: NO GROWTH
BACTERIA UR CULT: NORMAL

## 2018-10-10 NOTE — TELEPHONE ENCOUNTER
----- Message from Александр Vale MD sent at 10/10/2018  8:45 AM EDT -----  Celine, normal urine culture. Please let her know. Thanks Dr. Vale

## 2018-10-11 ENCOUNTER — APPOINTMENT (OUTPATIENT)
Dept: CT IMAGING | Facility: HOSPITAL | Age: 38
End: 2018-10-11
Attending: INTERNAL MEDICINE

## 2018-10-12 ENCOUNTER — TELEPHONE (OUTPATIENT)
Dept: OBSTETRICS AND GYNECOLOGY | Facility: CLINIC | Age: 38
End: 2018-10-12

## 2018-10-12 LAB
A VAGINAE DNA VAG QL NAA+PROBE: ABNORMAL SCORE
BVAB2 DNA VAG QL NAA+PROBE: ABNORMAL SCORE
C ALBICANS DNA VAG QL NAA+PROBE: NEGATIVE
C GLABRATA DNA VAG QL NAA+PROBE: NEGATIVE
C TRACH RRNA SPEC QL NAA+PROBE: NEGATIVE
MEGA1 DNA VAG QL NAA+PROBE: ABNORMAL SCORE
N GONORRHOEA RRNA SPEC QL NAA+PROBE: NEGATIVE
T VAGINALIS RRNA SPEC QL NAA+PROBE: NEGATIVE

## 2018-10-12 RX ORDER — METRONIDAZOLE 500 MG/1
500 TABLET ORAL 2 TIMES DAILY
Qty: 14 TABLET | Refills: 4 | Status: SHIPPED | OUTPATIENT
Start: 2018-10-12 | End: 2018-10-19

## 2018-10-12 NOTE — TELEPHONE ENCOUNTER
----- Message from Александр Vale MD sent at 10/12/2018 12:12 PM EDT -----  Celine, she has BV on culture, Rx sent to the pharmacy. Please let her know. Thanks, Dr. Vale

## 2018-10-26 ENCOUNTER — HOSPITAL ENCOUNTER (OUTPATIENT)
Dept: CT IMAGING | Facility: HOSPITAL | Age: 38
Discharge: HOME | End: 2018-10-26
Attending: INTERNAL MEDICINE | Admitting: INTERNAL MEDICINE

## 2018-10-26 DIAGNOSIS — G89.29 CHRONIC RLQ PAIN: ICD-10-CM

## 2018-10-26 DIAGNOSIS — R10.31 CHRONIC RLQ PAIN: ICD-10-CM

## 2018-10-26 LAB — CREAT BLDA-MCNC: 0.9 MG/DL (ref 0.6–1.3)

## 2018-10-26 PROCEDURE — 74177 CT ABD & PELVIS W/CONTRAST: CPT

## 2018-10-26 PROCEDURE — 25010000002 IOPAMIDOL 61 % SOLUTION: Performed by: INTERNAL MEDICINE

## 2018-10-26 PROCEDURE — 82565 ASSAY OF CREATININE: CPT

## 2018-10-26 RX ADMIN — IOPAMIDOL 85 ML: 612 INJECTION, SOLUTION INTRAVENOUS at 09:32

## 2018-11-05 ENCOUNTER — TELEPHONE (OUTPATIENT)
Dept: GASTROENTEROLOGY | Facility: CLINIC | Age: 38
End: 2018-11-05

## 2018-11-05 NOTE — TELEPHONE ENCOUNTER
CT shows a large amount of stool in the right colon.  Recommend starting a fiber supplement daily.  This should not contribute to the recent bowel changes that she's had.  Recommend office follow-up in 6 weeks

## 2018-11-08 RX ORDER — CALCIUM POLYCARBOPHIL 625 MG 625 MG/1
1250 TABLET ORAL DAILY
Qty: 60 TABLET | Refills: 5 | Status: SHIPPED | OUTPATIENT
Start: 2018-11-08 | End: 2019-05-20

## 2018-11-08 NOTE — TELEPHONE ENCOUNTER
Call returned to pt.  Advise per DR Herrera that CT shows a large amount of stool in the R colon.  Recommend starting a fiber supplement daily.  This should not contribute to the bowel changes pt has had.  Recommend office f/u in 6 weeks.    Pt verb understanding.  States in on [employer] with very limited income - asking if fiber supplement could be prescribed in order for insurance to cover.  Request to DR Jolly.    F/u appt scheduled for 12/18 @ 1:30 pm with SAUMYA Marinelli

## 2018-11-08 NOTE — TELEPHONE ENCOUNTER
Called pt and advised per Dr Jolly that she has sent her fibercon to her pharmacy and to call with a progress report in one month. Pt verb understanding.

## 2018-12-27 ENCOUNTER — TELEPHONE (OUTPATIENT)
Dept: OBSTETRICS AND GYNECOLOGY | Facility: CLINIC | Age: 38
End: 2018-12-27

## 2018-12-27 NOTE — TELEPHONE ENCOUNTER
Celine,     We discussed IUD for trial of hormonal suppression to see if pain was helped. If she has further questions she needs to be seen in office to discuss.      Yes, we use IUDs as both contraceptive and something to regulate cycles. So if using to regulate can be placed after tubes are tied.     Thanks   Dr. Vale    Pt called, after speaking to her primary care dr. Lopez is wanting to know she has to get a iud if her tubes are tied. And also if she can go a different route, pt stated the last time she had a iud, she didn't have a good experience from it. Please advise.       Pt callback: [phone]

## 2019-05-13 ENCOUNTER — OUTSIDE FACILITY SERVICE (OUTPATIENT)
Dept: NEUROLOGY | Facility: CLINIC | Age: 39
End: 2019-05-13

## 2019-05-13 PROCEDURE — 95910 NRV CNDJ TEST 7-8 STUDIES: CPT | Performed by: PSYCHIATRY & NEUROLOGY

## 2019-05-13 PROCEDURE — 95886 MUSC TEST DONE W/N TEST COMP: CPT | Performed by: PSYCHIATRY & NEUROLOGY

## 2019-05-20 ENCOUNTER — OFFICE VISIT (OUTPATIENT)
Dept: OBSTETRICS AND GYNECOLOGY | Facility: CLINIC | Age: 39
End: 2019-05-20

## 2019-05-20 VITALS
SYSTOLIC BLOOD PRESSURE: 158 MMHG | BODY MASS INDEX: 18.36 KG/M2 | DIASTOLIC BLOOD PRESSURE: 99 MMHG | HEART RATE: 71 BPM | HEIGHT: 67 IN | WEIGHT: 117 LBS

## 2019-05-20 DIAGNOSIS — Z01.419 ENCOUNTER FOR GYNECOLOGICAL EXAMINATION WITHOUT ABNORMAL FINDING: Primary | ICD-10-CM

## 2019-05-20 DIAGNOSIS — R87.610 ASCUS WITH POSITIVE HIGH RISK HPV CERVICAL: ICD-10-CM

## 2019-05-20 DIAGNOSIS — R87.810 ASCUS WITH POSITIVE HIGH RISK HPV CERVICAL: ICD-10-CM

## 2019-05-20 DIAGNOSIS — N93.9 ABNORMAL UTERINE BLEEDING (AUB): ICD-10-CM

## 2019-05-20 PROCEDURE — 99213 OFFICE O/P EST LOW 20 MIN: CPT | Performed by: OBSTETRICS & GYNECOLOGY

## 2019-05-20 PROCEDURE — 99395 PREV VISIT EST AGE 18-39: CPT | Performed by: OBSTETRICS & GYNECOLOGY

## 2019-05-20 RX ORDER — PROMETHAZINE HYDROCHLORIDE 25 MG/1
TABLET ORAL
COMMUNITY
Start: 2018-11-05 | End: 2020-04-29

## 2019-05-20 RX ORDER — IPRATROPIUM/ALBUTEROL SULFATE 20-100 MCG
MIST INHALER (GRAM) INHALATION
Refills: 10 | COMMUNITY
Start: 2019-03-23 | End: 2019-06-26

## 2019-05-20 RX ORDER — AZELASTINE 1 MG/ML
SPRAY, METERED NASAL
Refills: 1 | COMMUNITY
Start: 2019-04-17 | End: 2020-04-29

## 2019-05-20 RX ORDER — HYDROCHLOROTHIAZIDE 25 MG/1
25 TABLET ORAL DAILY
COMMUNITY
End: 2019-11-13

## 2019-05-20 RX ORDER — DICLOFENAC SODIUM 75 MG/1
75 TABLET, DELAYED RELEASE ORAL
COMMUNITY
Start: 2018-11-05 | End: 2020-10-14

## 2019-05-20 RX ORDER — METOPROLOL SUCCINATE 50 MG/1
50 TABLET, EXTENDED RELEASE ORAL DAILY
Refills: 1 | COMMUNITY
Start: 2019-03-20 | End: 2019-11-13

## 2019-05-20 RX ORDER — LISINOPRIL 40 MG/1
40 TABLET ORAL DAILY
COMMUNITY
End: 2019-10-11

## 2019-05-20 RX ORDER — MULTIVIT-MIN/IRON/FOLIC ACID/K 18-600-40
CAPSULE ORAL DAILY
COMMUNITY
End: 2020-04-29

## 2019-05-20 RX ORDER — METHIMAZOLE 10 MG/1
TABLET ORAL
COMMUNITY
Start: 2019-04-17 | End: 2019-08-21

## 2019-05-20 RX ORDER — DICYCLOMINE HCL 20 MG
20 TABLET ORAL
COMMUNITY
Start: 2018-01-30 | End: 2020-04-29

## 2019-05-20 RX ORDER — IBUPROFEN 800 MG/1
800 TABLET ORAL
COMMUNITY
Start: 2017-10-30 | End: 2019-10-11

## 2019-05-20 RX ORDER — CALCIUM POLYCARBOPHIL 625 MG
TABLET ORAL
COMMUNITY
Start: 2018-11-13 | End: 2019-11-13

## 2019-05-20 RX ORDER — OMEPRAZOLE 40 MG/1
CAPSULE, DELAYED RELEASE ORAL
COMMUNITY
Start: 2018-01-30 | End: 2019-11-13

## 2019-05-20 RX ORDER — MONTELUKAST SODIUM 10 MG/1
10 TABLET ORAL DAILY
COMMUNITY

## 2019-05-20 RX ORDER — AMLODIPINE BESYLATE 10 MG/1
TABLET ORAL
COMMUNITY
Start: 2018-01-11 | End: 2020-04-29

## 2019-05-20 RX ORDER — DULOXETIN HYDROCHLORIDE 20 MG/1
CAPSULE, DELAYED RELEASE ORAL
COMMUNITY
Start: 2018-01-09 | End: 2020-04-29

## 2019-05-20 RX ORDER — MULTIVITAMIN WITH IRON
TABLET ORAL
COMMUNITY
Start: 2018-09-25 | End: 2020-04-29

## 2019-05-20 RX ORDER — MEDROXYPROGESTERONE ACETATE 150 MG/ML
150 INJECTION, SUSPENSION INTRAMUSCULAR
Qty: 1 ML | Refills: 3 | Status: SHIPPED | OUTPATIENT
Start: 2019-05-20 | End: 2020-04-15 | Stop reason: SDUPTHER

## 2019-05-20 RX ORDER — DOXYCYCLINE 100 MG/1
CAPSULE ORAL
Refills: 0 | COMMUNITY
Start: 2019-03-20 | End: 2019-10-11

## 2019-05-20 RX ORDER — ALBUTEROL SULFATE 90 UG/1
2 AEROSOL, METERED RESPIRATORY (INHALATION)
COMMUNITY
End: 2019-11-13

## 2019-05-20 RX ORDER — TIOTROPIUM BROMIDE INHALATION SPRAY 1.56 UG/1
SPRAY, METERED RESPIRATORY (INHALATION)
COMMUNITY
Start: 2019-05-16 | End: 2019-10-11

## 2019-05-20 NOTE — PROGRESS NOTES
GYN Annual Exam     CC- Here for annual exam.     Brooklynn Perez is a 38 y.o. female who presents for annual well woman exam. Periods are regular every 28-30 days, lasting up to 20 days days. Dysmenorrhea:severe, occurring throughout menses. Cyclic symptoms include none. No intermenstrual bleeding, spotting, or discharge.    18 years old at menarche  14-28 weeks x 10-20 days of bleeding   Clots, heavy at times   Wears two pads and a pad at time to keep from soiling clothes   Known hyperthyroid/grave's disease - supposed to get thyroidectomy soon  Ultrasound 2018 - essentially normal      OB History      Para Term  AB Living    2 2       2    SAB TAB Ectopic Molar Multiple Live Births                         Current contraception: tubal ligation  History of abnormal Pap smear: yes - no treatment required  Family history of uterine, colon or ovarian cancer: no  History of abnormal mammogram: no  Family history of breast cancer: no  Last Pap : 2018 ASCUS HPV+, Colpo bX WNL    Past Medical History:   Diagnosis Date   • Asthma    • Bipolar disorder (CMS/HCC)    • GERD (gastroesophageal reflux disease)    • Graves disease    • Hyperlipidemia    • Hypertension        Past Surgical History:   Procedure Laterality Date   • APPENDECTOMY     • TUBAL ABDOMINAL LIGATION           Current Outpatient Medications:   •  amLODIPine (NORVASC) 10 MG tablet, TK 1 T PO  D, Disp: , Rfl:   •  diclofenac (VOLTAREN) 75 MG EC tablet, Take 75 mg by mouth., Disp: , Rfl:   •  dicyclomine (BENTYL) 20 MG tablet, Take 20 mg by mouth., Disp: , Rfl:   •  DULoxetine (CYMBALTA) 20 MG capsule, TK 1 C PO D, Disp: , Rfl:   •  ibuprofen (ADVIL,MOTRIN) 800 MG tablet, Take 800 mg by mouth., Disp: , Rfl:   •  Multiple Vitamins tablet, TK 1 T PO QD, Disp: , Rfl:   •  omeprazole (priLOSEC) 40 MG capsule, TK 1 C PO QD, Disp: , Rfl:   •  polycarbophil 625 MG tablet tablet, TK 2 TS PO D, Disp: , Rfl:   •  promethazine (PHENERGAN) 25 MG  tablet, TK 1 T PO Q 6 H FOR 14 DAYS, Disp: , Rfl:   •  albuterol sulfate  (90 Base) MCG/ACT inhaler, Inhale 2 puffs., Disp: , Rfl:   •  azelastine (ASTELIN) 0.1 % nasal spray, U 2 SPRAYS IEN BID, Disp: , Rfl: 1  •  budesonide-formoterol (SYMBICORT) 160-4.5 MCG/ACT inhaler, Inhale 2 puffs., Disp: , Rfl:   •  buPROPion SR (WELLBUTRIN SR) 150 MG 12 hr tablet, Take 150 mg by mouth., Disp: , Rfl:   •  Cetirizine HCl (ZYRTEC ALLERGY) 10 MG capsule, Take 10 mg by mouth., Disp: , Rfl:   •  Cholecalciferol (VITAMIN D) 2000 units capsule, Take  by mouth Daily., Disp: , Rfl:   •  COMBIVENT RESPIMAT  MCG/ACT inhaler, INL 1 PUFF PO QID, Disp: , Rfl: 10  •  cyclobenzaprine (FLEXERIL) 10 MG tablet, Take 10 mg by mouth., Disp: , Rfl:   •  doxycycline (MONODOX) 100 MG capsule, TK 1 C PO BID FOR 10 DAYS, Disp: , Rfl: 0  •  Fluticasone Furoate-Vilanterol (BREO ELLIPTA) 200-25 MCG/INH inhaler, Inhale., Disp: , Rfl:   •  Fluticasone-Salmeterol (ADVAIR HFA IN), Inhale Daily., Disp: , Rfl:   •  hydrochlorothiazide (HYDRODIURIL) 25 MG tablet, Take 25 mg by mouth Daily., Disp: , Rfl:   •  lisinopril (PRINIVIL,ZESTRIL) 40 MG tablet, Take 40 mg by mouth Daily., Disp: , Rfl:   •  methIMAzole (TAPAZOLE) 10 MG tablet, , Disp: , Rfl:   •  metoprolol succinate XL (TOPROL-XL) 50 MG 24 hr tablet, Take 50 mg by mouth Daily., Disp: , Rfl: 1  •  montelukast (SINGULAIR) 10 MG tablet, Take 10 mg by mouth Daily., Disp: , Rfl:   •  SPIRIVA RESPIMAT 1.25 MCG/ACT aerosol solution inhaler, , Disp: , Rfl:     Allergies   Allergen Reactions   • Allyl Isothiocyanate Hives   • Grape (Artificial) Flavor Hives   • Antihistamines, Diphenhydramine-Type Itching and Rash     Benadryl cream  Benadryl cream  Benadryl cream   • Latex Rash       Social History     Tobacco Use   • Smoking status: Former Smoker     Types: Cigars   • Smokeless tobacco: Never Used   Substance Use Topics   • Alcohol use: Yes     Comment: rarely   • Drug use: No       Family History  "  Problem Relation Age of Onset   • Colon cancer Maternal Aunt    • Colon cancer Maternal Uncle    • Breast cancer Neg Hx    • Ovarian cancer Neg Hx    • Uterine cancer Neg Hx    • Deep vein thrombosis Neg Hx    • Pulmonary embolism Neg Hx        Review of Systems   Constitutional: Negative for chills and fever.   Gastrointestinal: Negative for abdominal pain.   Genitourinary: Positive for menstrual problem. Negative for dysuria, pelvic pain, vaginal bleeding and vaginal discharge.   All other systems reviewed and are negative.      /99   Pulse 71   Ht 170.2 cm (67\")   Wt 53.1 kg (117 lb)   LMP 04/30/2019 (Exact Date)   Breastfeeding? No   BMI 18.32 kg/m²     Physical Exam   Constitutional: She is oriented to person, place, and time. She appears well-developed and well-nourished. No distress. She is not obese.  HENT:   Head: Normocephalic and atraumatic.   Eyes: Conjunctivae are normal. Right eye exhibits no discharge. Left eye exhibits no discharge.   Neck: Normal range of motion. Neck supple. No thyromegaly present.   Cardiovascular: Normal rate, regular rhythm and normal heart sounds.   No murmur heard.  Pulmonary/Chest: Effort normal and breath sounds normal. No respiratory distress. Right breast exhibits no inverted nipple, no mass and no nipple discharge. Left breast exhibits no inverted nipple, no mass and no nipple discharge.   Abdominal: Soft. Bowel sounds are normal. She exhibits no distension. There is no tenderness.   Genitourinary: Vagina normal and cervix normal. Pelvic exam was performed with patient supine. There is no lesion or Bartholin's cyst on the right labia. There is no lesion or Bartholin's cyst on the left labia. Uterus is enlarged (8-9 cm) and anteverted. Uterus is not deviated, fixed or exhibiting a mass.   Cervix is not parous. Cervix does not exhibit motion tenderness. Right adnexum displays no mass, no tenderness and no fullness. Left adnexum displays no mass, no tenderness " and no fullness. No bleeding in the vagina. No vaginal discharge found.   Musculoskeletal: Normal range of motion. She exhibits no edema.   Lymphadenopathy:     She has no cervical adenopathy.        Right: No inguinal adenopathy present.        Left: No inguinal adenopathy present.   Neurological: She is alert and oriented to person, place, and time.   Skin: Skin is warm and dry. No rash noted.   Psychiatric: She has a normal mood and affect. Her behavior is normal. Judgment and thought content normal.            Assessment     1) GYN annual well woman exam.   2) ASCUS HPV +, negative colpo   Pap repeated today   3) Menstrual regulation for AUB   Known issue with hyperthyroid from Grave's disease  Recent ultrasound essentially normal   Given recent stopped smoking - so not ready to do OCP due to risk of DVT   Discussed depo-provera   Aware of adjustment bleeding, amenorrhea and weight gain        Plan     1) Breast Health - Clinical breast exam yearly, Self breast awareness monthly  2) Pap - updated today    3) Smoking status- stopped two weeks ago due to cost.   4) Activity recommends - Adult 150-300 min/week of multi-component physical activities that include balance training, aerobic and physical strengthening.  Disabled or ill adults should still try to fulfill these requirements, with modifications based on their conditions.  5) Follow up prn and one year.       Александр Vale MD   5/20/2019  1:53 PM

## 2019-05-23 LAB
CYTOLOGIST CVX/VAG CYTO: ABNORMAL
CYTOLOGY CVX/VAG DOC CYTO: ABNORMAL
CYTOLOGY CVX/VAG DOC THIN PREP: ABNORMAL
DX ICD CODE: ABNORMAL
HIV 1 & 2 AB SER-IMP: ABNORMAL
HPV I/H RISK 1 DNA CVX QL PROBE+SIG AMP: POSITIVE
HPV16 DNA CVX QL PROBE+SIG AMP: NEGATIVE
HPV18 DNA CVX QL PROBE+SIG AMP: NEGATIVE
OTHER STN SPEC: ABNORMAL
STAT OF ADQ CVX/VAG CYTO-IMP: ABNORMAL

## 2019-05-24 ENCOUNTER — TELEPHONE (OUTPATIENT)
Dept: OBSTETRICS AND GYNECOLOGY | Facility: CLINIC | Age: 39
End: 2019-05-24

## 2019-05-24 NOTE — TELEPHONE ENCOUNTER
----- Message from Александр Vale MD sent at 5/24/2019 10:56 AM EDT -----  Celine, HPV + - she was ASCUS HPV + last year, so essentially still positive. Will need colpo. Please call and arrange. She is aware of results. Thanks, Dr. Vale

## 2019-05-28 ENCOUNTER — OFFICE VISIT (OUTPATIENT)
Dept: GASTROENTEROLOGY | Facility: CLINIC | Age: 39
End: 2019-05-28

## 2019-05-28 ENCOUNTER — CLINICAL SUPPORT (OUTPATIENT)
Dept: OBSTETRICS AND GYNECOLOGY | Facility: CLINIC | Age: 39
End: 2019-05-28

## 2019-05-28 VITALS
TEMPERATURE: 98.4 F | BODY MASS INDEX: 17.71 KG/M2 | HEIGHT: 67 IN | SYSTOLIC BLOOD PRESSURE: 144 MMHG | DIASTOLIC BLOOD PRESSURE: 100 MMHG | WEIGHT: 112.8 LBS

## 2019-05-28 VITALS
SYSTOLIC BLOOD PRESSURE: 157 MMHG | HEIGHT: 67 IN | DIASTOLIC BLOOD PRESSURE: 92 MMHG | BODY MASS INDEX: 17.74 KG/M2 | WEIGHT: 113 LBS | HEART RATE: 123 BPM

## 2019-05-28 DIAGNOSIS — N93.9 ABNORMAL UTERINE BLEEDING (AUB): Primary | ICD-10-CM

## 2019-05-28 DIAGNOSIS — R14.0 ABDOMINAL BLOATING: ICD-10-CM

## 2019-05-28 DIAGNOSIS — K59.00 CONSTIPATION, UNSPECIFIED CONSTIPATION TYPE: ICD-10-CM

## 2019-05-28 DIAGNOSIS — R10.10 PAIN OF UPPER ABDOMEN: Primary | ICD-10-CM

## 2019-05-28 PROCEDURE — 99214 OFFICE O/P EST MOD 30 MIN: CPT | Performed by: NURSE PRACTITIONER

## 2019-05-28 PROCEDURE — 96372 THER/PROPH/DIAG INJ SC/IM: CPT | Performed by: OBSTETRICS & GYNECOLOGY

## 2019-05-28 RX ORDER — MEDROXYPROGESTERONE ACETATE 150 MG/ML
150 INJECTION, SUSPENSION INTRAMUSCULAR ONCE
Status: COMPLETED | OUTPATIENT
Start: 2019-05-28 | End: 2019-05-28

## 2019-05-28 RX ADMIN — MEDROXYPROGESTERONE ACETATE 150 MG: 150 INJECTION, SUSPENSION INTRAMUSCULAR at 14:22

## 2019-05-28 NOTE — PROGRESS NOTES
(Patient supplied)  Patient here for depo injection. Patient did not have a reaction to the injection or medication. Injection on the right deltoid.  LOT: [id number]  EXP: 07/2021

## 2019-05-31 ENCOUNTER — TELEPHONE (OUTPATIENT)
Dept: GASTROENTEROLOGY | Facility: CLINIC | Age: 39
End: 2019-05-31

## 2019-05-31 RX ORDER — POLYETHYLENE GLYCOL 3350 17 G/17G
17 POWDER, FOR SOLUTION ORAL DAILY
Qty: 30 PACKET | Refills: 5 | Status: SHIPPED | OUTPATIENT
Start: 2019-05-31 | End: 2020-04-15 | Stop reason: SDUPTHER

## 2019-05-31 NOTE — TELEPHONE ENCOUNTER
Called pt and advised per Megha NP that we have escribed miralax to her [facility] pharmacy.  Pt verb understanding.

## 2019-05-31 NOTE — TELEPHONE ENCOUNTER
----- Message from Patricia Eddy sent at 5/31/2019 11:18 AM EDT -----  Regarding: Call back  Contact: [phone]  Pt said Megha ask her to call back to let her know which med work better, the med she liked was miralax,

## 2019-05-31 NOTE — TELEPHONE ENCOUNTER
Call to pt.  States using miralax with good results.  Understood from appt of 5/28 that could be prescribed.  Message to SAUMYA Cavazos.

## 2019-06-04 ENCOUNTER — PROCEDURE VISIT (OUTPATIENT)
Dept: OBSTETRICS AND GYNECOLOGY | Facility: CLINIC | Age: 39
End: 2019-06-04

## 2019-06-04 VITALS
HEART RATE: 84 BPM | HEIGHT: 67 IN | SYSTOLIC BLOOD PRESSURE: 140 MMHG | BODY MASS INDEX: 17.58 KG/M2 | DIASTOLIC BLOOD PRESSURE: 91 MMHG | WEIGHT: 112 LBS

## 2019-06-04 DIAGNOSIS — R87.610 ASCUS WITH POSITIVE HIGH RISK HPV CERVICAL: ICD-10-CM

## 2019-06-04 DIAGNOSIS — N84.1 CERVICAL POLYP: Primary | ICD-10-CM

## 2019-06-04 DIAGNOSIS — R87.810 ASCUS WITH POSITIVE HIGH RISK HPV CERVICAL: ICD-10-CM

## 2019-06-04 PROCEDURE — 57456 ENDOCERV CURETTAGE W/SCOPE: CPT | Performed by: OBSTETRICS & GYNECOLOGY

## 2019-06-04 RX ORDER — CETIRIZINE HYDROCHLORIDE 10 MG/1
TABLET ORAL
COMMUNITY
Start: 2019-05-31 | End: 2020-04-29

## 2019-06-04 RX ORDER — BUDESONIDE AND FORMOTEROL FUMARATE DIHYDRATE 160; 4.5 UG/1; UG/1
2 AEROSOL RESPIRATORY (INHALATION)
COMMUNITY
Start: 2015-12-21 | End: 2019-06-26

## 2019-06-04 NOTE — PROGRESS NOTES
Colposcopy Procedure Note    Indications: Most recent Pap smear showed: Normal with HPV pos.  Prior cervical/vaginal disease: ASCUS HPV pos.  Prior cervical treatment: colpo only, then normalized     Procedure Details   The risks and benefits of the procedure and Verbal informed consent obtained.    Speculum placed in vagina and excellent visualization of cervix achieved, cervix swabbed x 3 with acetic acid solution and deferred lugol's due to prior reaction      Findings:  Cervix: no visible lesions, no mosaicism, no punctation and no abnormal vasculature; SCJ visualized 360 degrees without lesions, endocervical curettage performed, specimen labelled and sent to pathology and hemostasis achieved with Monsel's solution. Noted polyp, grasped with ring forceps, twisted off and sent with ECC   Vaginal inspection: vaginal colposcopy not performed.  Vulvar colposcopy: vulvar colposcopy not performed.    OBGyn Exam    Specimens: cervical polyp and ECC   Tolerated well     Complications: none.    Plan:  Specimens labelled and sent to Pathology.  Will base further treatment on Pathology findings.  Post biopsy instructions given to patient.    Александр Vale MD  6/4/2019  2:22 PM

## 2019-06-11 ENCOUNTER — TELEPHONE (OUTPATIENT)
Dept: OBSTETRICS AND GYNECOLOGY | Facility: CLINIC | Age: 39
End: 2019-06-11

## 2019-06-11 LAB
DX ICD CODE: NORMAL
PATH REPORT.FINAL DX SPEC: NORMAL
PATH REPORT.GROSS SPEC: NORMAL
PATH REPORT.SITE OF ORIGIN SPEC: NORMAL
PATHOLOGIST NAME: NORMAL
PAYMENT PROCEDURE: NORMAL

## 2019-06-11 NOTE — TELEPHONE ENCOUNTER
----- Message from Александр Vale MD sent at 6/11/2019  5:11 PM EDT -----  Celine, her biopsies are normal. So should be put on recall list to repeat Co-Test in one year. Please let her know and arrange. Thanks, Dr. Vale

## 2019-06-26 ENCOUNTER — OFFICE VISIT (OUTPATIENT)
Dept: GASTROENTEROLOGY | Facility: CLINIC | Age: 39
End: 2019-06-26

## 2019-06-26 VITALS
HEIGHT: 67 IN | SYSTOLIC BLOOD PRESSURE: 136 MMHG | TEMPERATURE: 98.3 F | WEIGHT: 120 LBS | BODY MASS INDEX: 18.83 KG/M2 | DIASTOLIC BLOOD PRESSURE: 84 MMHG

## 2019-06-26 DIAGNOSIS — R10.30 LOWER ABDOMINAL PAIN: ICD-10-CM

## 2019-06-26 DIAGNOSIS — K59.00 CONSTIPATION, UNSPECIFIED CONSTIPATION TYPE: Primary | ICD-10-CM

## 2019-06-26 DIAGNOSIS — R14.0 ABDOMINAL BLOATING: ICD-10-CM

## 2019-06-26 PROCEDURE — 99214 OFFICE O/P EST MOD 30 MIN: CPT | Performed by: NURSE PRACTITIONER

## 2019-06-26 NOTE — PROGRESS NOTES
Chief Complaint   Patient presents with   • Constipation       Brooklynn Perez is a  38 y.o. female here for a follow up visit for constipation.     HPI  39 yo f presents today for follow up visit for constipation. She is a patient of Dr. Jolly. She was last seen in the office on 5/28/19. She has been taking the miralax and admits it works great for her. She has been taking it daily and having a large BM. She tried the trulance but admits it gave her diarrhea. She has been taking the miralax now daily and denies any further abd pain or bloating. She denies any dysphagia, reflux, N&V, diarrhea, rectal bleeding or melena. She admits her appetite is good and her weight is stable.       Past Medical History:   Diagnosis Date   • Asthma    • Bipolar disorder (CMS/HCC)    • GERD (gastroesophageal reflux disease)    • Graves disease    • Hyperlipidemia    • Hypertension    • IBS (irritable bowel syndrome)        Past Surgical History:   Procedure Laterality Date   • APPENDECTOMY     • COLONOSCOPY  2017   • TUBAL ABDOMINAL LIGATION         Scheduled Meds:    Continuous Infusions:  No current facility-administered medications for this visit.     PRN Meds:.    Allergies   Allergen Reactions   • Allyl Isothiocyanate Hives   • Grape (Artificial) Flavor Hives   • Antihistamines, Diphenhydramine-Type Itching and Rash     Benadryl cream  Benadryl cream  Benadryl cream   • Latex Rash       Social History     Socioeconomic History   • Marital status: Unknown     Spouse name: Not on file   • Number of children: Not on file   • Years of education: Not on file   • Highest education level: Not on file   Tobacco Use   • Smoking status: Former Smoker     Types: Cigars   • Smokeless tobacco: Never Used   Substance and Sexual Activity   • Alcohol use: Yes     Comment: rarely   • Drug use: No   • Sexual activity: Yes     Partners: Male     Birth control/protection: Tubal ligation       Family History   Problem Relation Age of Onset   •  Colon cancer Maternal Aunt    • Colon cancer Maternal Uncle    • Breast cancer Neg Hx    • Ovarian cancer Neg Hx    • Uterine cancer Neg Hx    • Deep vein thrombosis Neg Hx    • Pulmonary embolism Neg Hx        Review of Systems   Constitutional: Negative for appetite change, chills, diaphoresis, fatigue, fever and unexpected weight change.   HENT: Negative for nosebleeds, postnasal drip, sore throat, trouble swallowing and voice change.    Respiratory: Negative for cough, choking, chest tightness, shortness of breath and wheezing.    Cardiovascular: Negative for chest pain, palpitations and leg swelling.   Gastrointestinal: Negative for abdominal distention, abdominal pain, anal bleeding, blood in stool, constipation, diarrhea, nausea, rectal pain and vomiting.   Endocrine: Negative for polydipsia, polyphagia and polyuria.   Musculoskeletal: Negative for gait problem.   Skin: Negative for rash and wound.   Allergic/Immunologic: Negative for food allergies.   Neurological: Negative for dizziness, speech difficulty and light-headedness.   Psychiatric/Behavioral: Negative for confusion, self-injury, sleep disturbance and suicidal ideas.       Vitals:    06/26/19 0953   BP: 136/84   Temp: 98.3 °F (36.8 °C)       Physical Exam   Constitutional: She is oriented to person, place, and time. She appears well-developed and well-nourished. She does not appear ill. No distress.   HENT:   Head: Normocephalic.   Eyes: Pupils are equal, round, and reactive to light.   Cardiovascular: Normal rate, regular rhythm and normal heart sounds.   Pulmonary/Chest: Effort normal and breath sounds normal.   Abdominal: Soft. Bowel sounds are normal. She exhibits no distension and no mass. There is no hepatosplenomegaly. There is no tenderness. There is no rebound and no guarding. No hernia.   Musculoskeletal: Normal range of motion.   Neurological: She is alert and oriented to person, place, and time.   Skin: Skin is warm and dry.    Psychiatric: She has a normal mood and affect. Her speech is normal and behavior is normal. Judgment normal.       No images are attached to the encounter.    Assessment & Plan    1. Constipation, unspecified constipation type    2. Lower abdominal pain    3. Abdominal bloating      Constipation is much improved with daily miralax. The abd pain and bloating has resolved. Continue the miralax for now. Call office with any issues. Follow up with me in 3 months.

## 2019-07-13 ENCOUNTER — TELEPHONE (OUTPATIENT)
Dept: OBSTETRICS AND GYNECOLOGY | Facility: CLINIC | Age: 39
End: 2019-07-13

## 2019-07-13 NOTE — TELEPHONE ENCOUNTER
"Received call from answering service that patient is \"having some questions I have been bleeding for 20 days.\"  Tried patient back at 1244 PM and went to . Left general  reminding patient to turn off call block and that I would call back from possibly a generic or de-identified number.  Tried patient back now and went to  again.  Left general  to turn off call block and call us back or to call with a different number if she still needs assistance.   "

## 2019-08-21 ENCOUNTER — CLINICAL SUPPORT (OUTPATIENT)
Dept: OBSTETRICS AND GYNECOLOGY | Facility: CLINIC | Age: 39
End: 2019-08-21

## 2019-08-21 VITALS
BODY MASS INDEX: 19.62 KG/M2 | DIASTOLIC BLOOD PRESSURE: 66 MMHG | SYSTOLIC BLOOD PRESSURE: 100 MMHG | HEART RATE: 71 BPM | WEIGHT: 125 LBS | HEIGHT: 67 IN

## 2019-08-21 DIAGNOSIS — Z30.42 ENCOUNTER FOR MANAGEMENT AND INJECTION OF DEPO-PROVERA: Primary | ICD-10-CM

## 2019-08-21 PROCEDURE — 96372 THER/PROPH/DIAG INJ SC/IM: CPT | Performed by: OBSTETRICS & GYNECOLOGY

## 2019-08-21 RX ORDER — MEDROXYPROGESTERONE ACETATE 150 MG/ML
150 INJECTION, SUSPENSION INTRAMUSCULAR ONCE
Status: COMPLETED | OUTPATIENT
Start: 2019-08-21 | End: 2019-08-21

## 2019-08-21 RX ORDER — LEVOTHYROXINE SODIUM 88 UG/1
TABLET ORAL DAILY
Refills: 0 | COMMUNITY
Start: 2019-08-02 | End: 2019-10-11

## 2019-08-21 RX ORDER — HYDROCODONE BITARTRATE AND ACETAMINOPHEN 5; 325 MG/1; MG/1
TABLET ORAL
Refills: 0 | COMMUNITY
Start: 2019-08-01 | End: 2019-10-11

## 2019-08-21 RX ADMIN — MEDROXYPROGESTERONE ACETATE 150 MG: 150 INJECTION, SUSPENSION INTRAMUSCULAR at 11:24

## 2019-08-21 NOTE — PROGRESS NOTES
(Patient supplied)  Patient here for depo injection. Patient did not have a reaction to the injection or medication. Injection on the right ventrogluteal.

## 2019-10-11 ENCOUNTER — OFFICE VISIT (OUTPATIENT)
Dept: GASTROENTEROLOGY | Facility: CLINIC | Age: 39
End: 2019-10-11

## 2019-10-11 VITALS
SYSTOLIC BLOOD PRESSURE: 114 MMHG | TEMPERATURE: 98.1 F | HEIGHT: 67 IN | BODY MASS INDEX: 21.16 KG/M2 | WEIGHT: 134.8 LBS | DIASTOLIC BLOOD PRESSURE: 70 MMHG

## 2019-10-11 DIAGNOSIS — R10.30 LOWER ABDOMINAL PAIN: ICD-10-CM

## 2019-10-11 DIAGNOSIS — K59.00 CONSTIPATION, UNSPECIFIED CONSTIPATION TYPE: Primary | ICD-10-CM

## 2019-10-11 PROCEDURE — 99214 OFFICE O/P EST MOD 30 MIN: CPT | Performed by: NURSE PRACTITIONER

## 2019-10-11 RX ORDER — LEVOTHYROXINE SODIUM 0.1 MG/1
100 TABLET ORAL DAILY
COMMUNITY
End: 2020-04-29

## 2019-10-11 NOTE — PROGRESS NOTES
Chief Complaint   Patient presents with   • Constipation       Brooklynn Perez is a  39 y.o. female here for a follow up visit for constipation.    HPI  39-year-old female presents today for follow-up visit for chronic constipation lower abdominal pain and cramping.  She is a patient of Dr. Jolly.  She was last seen in the office on 6/26/2019.  She has a history of chronic constipation as well as Graves' disease and bipolar disorder.  She admits she does well most the time on MiraLAX as needed.  Right now she is currently taking it every 2 to 3 days and admits that is giving her a daily bowel movement.  She is not having any abdominal pain or cramping at this time.  She will eat will use Bentyl as needed from time to time.  She denies any dysphagia, abdominal pain, nausea vomiting, diarrhea, constipation, rectal bleeding or melena.  She notes her appetite is good and she has gained weight lately and is worried this is due to her Depo-Provera shot.  She plans to talk to her GYN about this.  Her last colonoscopy was done in 2017.  She underwent a thyroidectomy this past August.    Past Medical History:   Diagnosis Date   • Asthma    • Bipolar disorder (CMS/HCC)    • GERD (gastroesophageal reflux disease)    • Graves disease    • Hyperlipidemia    • Hypertension    • IBS (irritable bowel syndrome)        Past Surgical History:   Procedure Laterality Date   • APPENDECTOMY     • COLONOSCOPY  2017   • THYROIDECTOMY  08/2019   • TUBAL ABDOMINAL LIGATION         Scheduled Meds:    Continuous Infusions:  No current facility-administered medications for this visit.     PRN Meds:.    Allergies   Allergen Reactions   • Allyl Isothiocyanate Hives   • Grape (Artificial) Flavor Hives   • Antihistamines, Diphenhydramine-Type Itching and Rash     Benadryl cream  Benadryl cream  Benadryl cream   • Latex Rash       Social History     Socioeconomic History   • Marital status: Unknown     Spouse name: Not on file   • Number of  children: Not on file   • Years of education: Not on file   • Highest education level: Not on file   Tobacco Use   • Smoking status: Former Smoker     Types: Cigars   • Smokeless tobacco: Never Used   Substance and Sexual Activity   • Alcohol use: Yes     Comment: rarely   • Drug use: No   • Sexual activity: Yes     Partners: Male     Birth control/protection: Tubal ligation       Family History   Problem Relation Age of Onset   • Colon cancer Maternal Aunt    • Colon cancer Maternal Uncle    • Breast cancer Neg Hx    • Ovarian cancer Neg Hx    • Uterine cancer Neg Hx    • Deep vein thrombosis Neg Hx    • Pulmonary embolism Neg Hx        Review of Systems   Constitutional: Negative for appetite change, chills, diaphoresis, fatigue, fever and unexpected weight change.   HENT: Negative for nosebleeds, postnasal drip, sore throat, trouble swallowing and voice change.    Respiratory: Negative for cough, choking, chest tightness, shortness of breath and wheezing.    Cardiovascular: Negative for chest pain, palpitations and leg swelling.   Gastrointestinal: Negative for abdominal distention, abdominal pain, anal bleeding, blood in stool, constipation, diarrhea, nausea, rectal pain and vomiting.   Endocrine: Negative for polydipsia, polyphagia and polyuria.   Musculoskeletal: Negative for gait problem.   Skin: Negative for rash and wound.   Allergic/Immunologic: Negative for food allergies.   Neurological: Negative for dizziness, speech difficulty and light-headedness.   Psychiatric/Behavioral: Negative for confusion, self-injury, sleep disturbance and suicidal ideas.       Vitals:    10/11/19 1053   BP: 114/70   Temp: 98.1 °F (36.7 °C)       Physical Exam   Constitutional: She is oriented to person, place, and time. She appears well-developed and well-nourished. She does not appear ill. No distress.   HENT:   Head: Normocephalic.   Eyes: Pupils are equal, round, and reactive to light.   Cardiovascular: Normal rate,  regular rhythm and normal heart sounds.   Pulmonary/Chest: Effort normal and breath sounds normal.   Abdominal: Soft. Bowel sounds are normal. She exhibits no distension and no mass. There is no hepatosplenomegaly. There is no tenderness. There is no rebound and no guarding. No hernia.   Musculoskeletal: Normal range of motion.   Neurological: She is alert and oriented to person, place, and time.   Skin: Skin is warm and dry.   Psychiatric: She has a normal mood and affect. Her speech is normal and behavior is normal. Judgment normal.       No images are attached to the encounter.    Assessment and plan    1. Constipation, unspecified constipation type    2. Lower abdominal pain    Constipation seems well controlled at this time on MiraLAX as needed.  Continue Bentyl as needed.  Call the office with any issues.  Patient to follow-up with me in 6 months.

## 2019-11-13 ENCOUNTER — CLINICAL SUPPORT (OUTPATIENT)
Dept: OBSTETRICS AND GYNECOLOGY | Facility: CLINIC | Age: 39
End: 2019-11-13

## 2019-11-13 VITALS
DIASTOLIC BLOOD PRESSURE: 96 MMHG | WEIGHT: 143 LBS | HEIGHT: 67 IN | SYSTOLIC BLOOD PRESSURE: 133 MMHG | BODY MASS INDEX: 22.44 KG/M2 | HEART RATE: 85 BPM

## 2019-11-13 DIAGNOSIS — Z30.42 ENCOUNTER FOR MANAGEMENT AND INJECTION OF DEPO-PROVERA: Primary | ICD-10-CM

## 2019-11-13 PROCEDURE — 96372 THER/PROPH/DIAG INJ SC/IM: CPT | Performed by: OBSTETRICS & GYNECOLOGY

## 2019-11-13 RX ORDER — TIOTROPIUM BROMIDE INHALATION SPRAY 1.56 UG/1
SPRAY, METERED RESPIRATORY (INHALATION)
Refills: 3 | COMMUNITY
Start: 2019-10-15 | End: 2020-10-14

## 2019-11-13 RX ORDER — MEDROXYPROGESTERONE ACETATE 150 MG/ML
150 INJECTION, SUSPENSION INTRAMUSCULAR ONCE
Status: COMPLETED | OUTPATIENT
Start: 2019-11-13 | End: 2019-11-13

## 2019-11-13 RX ORDER — CHLORTHALIDONE 25 MG/1
25 TABLET ORAL DAILY
Refills: 3 | COMMUNITY
Start: 2019-10-26 | End: 2020-04-29

## 2019-11-13 RX ORDER — ALBUTEROL SULFATE 2.5 MG/3ML
SOLUTION RESPIRATORY (INHALATION)
Refills: 0 | COMMUNITY
Start: 2019-10-26 | End: 2020-04-29

## 2019-11-13 RX ORDER — METOPROLOL SUCCINATE 25 MG/1
TABLET, EXTENDED RELEASE ORAL
Refills: 0 | COMMUNITY
Start: 2019-09-30 | End: 2020-04-29

## 2019-11-13 RX ADMIN — MEDROXYPROGESTERONE ACETATE 150 MG: 150 INJECTION, SUSPENSION INTRAMUSCULAR at 13:20

## 2020-02-05 ENCOUNTER — CLINICAL SUPPORT (OUTPATIENT)
Dept: OBSTETRICS AND GYNECOLOGY | Facility: CLINIC | Age: 40
End: 2020-02-05

## 2020-02-05 VITALS
HEIGHT: 67 IN | BODY MASS INDEX: 23.39 KG/M2 | WEIGHT: 149 LBS | DIASTOLIC BLOOD PRESSURE: 75 MMHG | HEART RATE: 76 BPM | SYSTOLIC BLOOD PRESSURE: 113 MMHG

## 2020-02-05 DIAGNOSIS — Z30.42 ENCOUNTER FOR MANAGEMENT AND INJECTION OF DEPO-PROVERA: Primary | ICD-10-CM

## 2020-02-05 PROCEDURE — 96372 THER/PROPH/DIAG INJ SC/IM: CPT | Performed by: OBSTETRICS & GYNECOLOGY

## 2020-02-05 RX ORDER — MEDROXYPROGESTERONE ACETATE 150 MG/ML
150 INJECTION, SUSPENSION INTRAMUSCULAR ONCE
Status: COMPLETED | OUTPATIENT
Start: 2020-02-05 | End: 2020-02-05

## 2020-02-05 RX ADMIN — MEDROXYPROGESTERONE ACETATE 150 MG: 150 INJECTION, SUSPENSION INTRAMUSCULAR at 13:06

## 2020-04-15 ENCOUNTER — TELEPHONE (OUTPATIENT)
Dept: GASTROENTEROLOGY | Facility: CLINIC | Age: 40
End: 2020-04-15

## 2020-04-15 ENCOUNTER — OFFICE VISIT (OUTPATIENT)
Dept: GASTROENTEROLOGY | Facility: CLINIC | Age: 40
End: 2020-04-15

## 2020-04-15 ENCOUNTER — TELEPHONE (OUTPATIENT)
Dept: OBSTETRICS AND GYNECOLOGY | Facility: CLINIC | Age: 40
End: 2020-04-15

## 2020-04-15 VITALS — HEIGHT: 67 IN | BODY MASS INDEX: 24.8 KG/M2 | WEIGHT: 158 LBS

## 2020-04-15 DIAGNOSIS — K59.00 CONSTIPATION, UNSPECIFIED CONSTIPATION TYPE: Primary | ICD-10-CM

## 2020-04-15 DIAGNOSIS — K21.9 GASTROESOPHAGEAL REFLUX DISEASE, ESOPHAGITIS PRESENCE NOT SPECIFIED: ICD-10-CM

## 2020-04-15 DIAGNOSIS — R10.30 LOWER ABDOMINAL PAIN: ICD-10-CM

## 2020-04-15 PROCEDURE — 99442 PR PHYS/QHP TELEPHONE EVALUATION 11-20 MIN: CPT | Performed by: NURSE PRACTITIONER

## 2020-04-15 RX ORDER — EPINEPHRINE 0.3 MG/.3ML
INJECTION SUBCUTANEOUS
COMMUNITY
Start: 2020-03-24 | End: 2020-10-14

## 2020-04-15 RX ORDER — IPRATROPIUM BROMIDE AND ALBUTEROL SULFATE 2.5; .5 MG/3ML; MG/3ML
SOLUTION RESPIRATORY (INHALATION)
COMMUNITY
Start: 2020-03-13 | End: 2020-04-29

## 2020-04-15 RX ORDER — LEVOTHYROXINE SODIUM 125 MCG
TABLET ORAL DAILY
COMMUNITY
Start: 2020-04-02 | End: 2020-10-14

## 2020-04-15 RX ORDER — MEDROXYPROGESTERONE ACETATE 150 MG/ML
150 INJECTION, SUSPENSION INTRAMUSCULAR
Qty: 1 ML | Refills: 3 | Status: SHIPPED | OUTPATIENT
Start: 2020-04-15 | End: 2020-04-29

## 2020-04-15 RX ORDER — OMEPRAZOLE 40 MG/1
40 CAPSULE, DELAYED RELEASE ORAL DAILY
Qty: 30 CAPSULE | Refills: 11 | Status: SHIPPED | OUTPATIENT
Start: 2020-04-15 | End: 2020-04-29

## 2020-04-15 RX ORDER — POLYETHYLENE GLYCOL 3350 17 G/17G
17 POWDER, FOR SOLUTION ORAL DAILY
Qty: 30 PACKET | Refills: 11 | Status: SHIPPED | OUTPATIENT
Start: 2020-04-15 | End: 2020-04-29

## 2020-04-15 NOTE — TELEPHONE ENCOUNTER
Good Afternoon,    Patient is needing a refill of MedroxyPROGESTERone Acetate (DEPO-PROVERA) injection 150 mg [[id number]] (Order [id number]) called into her pharmacy.    Pharmacy has been confirmed- [facility] on [address].     Thanks!  Rossi

## 2020-04-15 NOTE — PROGRESS NOTES
Chief Complaint   Patient presents with   • Follow-up   • Constipation   • Abdominal Pain       Brooklynn Perez is a  39 y.o. female here for a telephone follow up visit for constipation.    HPI  39-year-old female presents today for telephone follow-up visit for constipation and GERD. You have chosen to receive care through a telephone visit. Do you consent to use a telephone visit for your medical care today? She answered YES.         She is a patient of Dr. Jolly.  She was last seen in the office on 10/11/2019.  She has a history of constipation but admits normally she does well on MiraLAX daily but she was hospitalized in March for an asthma attack and what she feels was an allergic reaction to Elderberry syrup.  She was given lots of steroids and steroid inhalers and was not given her MiraLAX in the hospital.  She tells me she went about 2 weeks without a bowel movement when she came home.  She does me her bowels just have not been the same since coming home.  She is back on her MiraLAX but admits she has run out and needs a refill.  She also is a history of GERD and admits lately she is not been taking her omeprazole she ran out of it as well.  Normally she takes omeprazole 40 mg once daily and that works well for her.  She denies any dysphagia, nausea and vomiting, diarrhea, rectal bleeding or melena.  She much appetite is good and her weight is stable.  Her last colonoscopy was done in 2017.  She has history of appendectomy.  Past Medical History:   Diagnosis Date   • Asthma    • Bipolar disorder (CMS/HCC)    • GERD (gastroesophageal reflux disease)    • Graves disease    • Hyperlipidemia    • Hypertension    • IBS (irritable bowel syndrome)        Past Surgical History:   Procedure Laterality Date   • APPENDECTOMY     • COLONOSCOPY  2017   • THYROIDECTOMY  08/2019   • TUBAL ABDOMINAL LIGATION         Scheduled Meds:    Continuous Infusions:  No current facility-administered medications for this visit.      PRN Meds:.    Allergies   Allergen Reactions   • Allyl Isothiocyanate Hives   • Grape (Artificial) Flavor Hives   • Antihistamines, Diphenhydramine-Type Itching and Rash     Benadryl cream  Benadryl cream  Benadryl cream   • Latex Rash       Social History     Socioeconomic History   • Marital status: Unknown     Spouse name: Not on file   • Number of children: Not on file   • Years of education: Not on file   • Highest education level: Not on file   Tobacco Use   • Smoking status: Former Smoker     Types: Cigars   • Smokeless tobacco: Never Used   Substance and Sexual Activity   • Alcohol use: Yes     Comment: rarely   • Drug use: No   • Sexual activity: Yes     Partners: Male     Birth control/protection: Tubal ligation       Family History   Problem Relation Age of Onset   • Colon cancer Maternal Aunt    • Colon cancer Maternal Uncle    • Breast cancer Neg Hx    • Ovarian cancer Neg Hx    • Uterine cancer Neg Hx    • Deep vein thrombosis Neg Hx    • Pulmonary embolism Neg Hx        Review of Systems   Constitutional: Negative for appetite change, chills, diaphoresis, fatigue, fever and unexpected weight change.   HENT: Negative for nosebleeds, postnasal drip, sore throat, trouble swallowing and voice change.    Respiratory: Negative for cough, choking, chest tightness, shortness of breath and wheezing.    Cardiovascular: Negative for chest pain, palpitations and leg swelling.   Gastrointestinal: Positive for abdominal distention and constipation. Negative for abdominal pain, anal bleeding, blood in stool, diarrhea, nausea, rectal pain and vomiting.   Endocrine: Negative for polydipsia, polyphagia and polyuria.   Musculoskeletal: Negative for gait problem.   Skin: Negative for rash and wound.   Allergic/Immunologic: Negative for food allergies.   Neurological: Negative for dizziness, speech difficulty and light-headedness.   Psychiatric/Behavioral: Negative for confusion, self-injury, sleep disturbance and  suicidal ideas.       There were no vitals filed for this visit.    Physical Exam   Constitutional: She is oriented to person, place, and time. No distress.   Neurological: She is alert and oriented to person, place, and time.   Psychiatric: She has a normal mood and affect. Her behavior is normal. Judgment and thought content normal.       No radiology results for the last 7 days     Assessment and plan    1. Constipation, unspecified constipation type    2. Lower abdominal pain    3. Gastroesophageal reflux disease, esophagitis presence not specified    Today's visit was done over the telephone.  Total time over the phone with the patient was 20 minutes.  Constipation is not well controlled at this time.  She will need to get back on her MiraLAX daily.  She needs to increase her water intake.  She is just needs to get over the side effects of the steroids and all the other medicines she has had since her asthma attack and hospitalization last month.  GERD seems uncontrolled as well.  We will get her back on the omeprazole 40 mg once daily.  Continue GERD precautions.  I will call the patient again next week for a telephone follow-up since we are not seeing patients in the office due to the COVID-19 pandemic.  Patient is agreeable to the plan.  Patient to call the office back with any issues.

## 2020-04-16 ENCOUNTER — TELEPHONE (OUTPATIENT)
Dept: GASTROENTEROLOGY | Facility: CLINIC | Age: 40
End: 2020-04-16

## 2020-04-16 NOTE — TELEPHONE ENCOUNTER
Called pt and advised of note. Pt states she is not working and can not pay for her meds. ADvised pt she [facility] and get 30 pills of omeprazole 20mg for $13.  Pt states she can not afford this.  Advised pt to check with passport and see what they tell her.  Pt verb understanding.

## 2020-04-16 NOTE — TELEPHONE ENCOUNTER
----- Message from Nisreen Owen Rep sent at 4/16/2020 10:51 AM EDT -----  Regarding: Medication   Contact: [phone]  Pt states she would prefer her prescription for omeprazole (priLOSEC) to be  20mg as she says the 40mg are too strong.     She also states the pharmacy called her and said there was a problem with her insurance and the prescription but she can't remember exactly what they said.

## 2020-04-16 NOTE — TELEPHONE ENCOUNTER
Called pt's [facility] and spoke with pharmacist Jermaine who advised that the pt's insurance will only cover 90 pills in a calendar year and she will need to get this otc.     Called pt and on identified vm advised of the above. Advised pt she can get omeprazole otc and take this daily. Advised pt to call back with any questions.

## 2020-04-17 ENCOUNTER — PRIOR AUTHORIZATION (OUTPATIENT)
Dept: GASTROENTEROLOGY | Facility: CLINIC | Age: 40
End: 2020-04-17

## 2020-04-29 ENCOUNTER — CLINICAL SUPPORT (OUTPATIENT)
Dept: OBSTETRICS AND GYNECOLOGY | Facility: CLINIC | Age: 40
End: 2020-04-29

## 2020-04-29 DIAGNOSIS — Z30.42 ENCOUNTER FOR MANAGEMENT AND INJECTION OF DEPO-PROVERA: Primary | ICD-10-CM

## 2020-04-29 PROCEDURE — 96372 THER/PROPH/DIAG INJ SC/IM: CPT | Performed by: OBSTETRICS & GYNECOLOGY

## 2020-04-29 RX ORDER — OMEPRAZOLE 20 MG/1
20 CAPSULE, DELAYED RELEASE ORAL DAILY
COMMUNITY
End: 2020-09-22

## 2020-04-29 RX ORDER — ECHINACEA PURPUREA EXTRACT 125 MG
1 TABLET ORAL
COMMUNITY
End: 2020-10-14

## 2020-04-29 RX ORDER — MEDROXYPROGESTERONE ACETATE 150 MG/ML
150 INJECTION, SUSPENSION INTRAMUSCULAR
COMMUNITY
Start: 2019-05-20 | End: 2020-07-29 | Stop reason: SDUPTHER

## 2020-04-29 RX ORDER — MEDROXYPROGESTERONE ACETATE 150 MG/ML
150 INJECTION, SUSPENSION INTRAMUSCULAR ONCE
Status: COMPLETED | OUTPATIENT
Start: 2020-04-29 | End: 2020-04-29

## 2020-04-29 RX ORDER — CHLORTHALIDONE 25 MG/1
25 TABLET ORAL DAILY
COMMUNITY
End: 2020-07-29 | Stop reason: SDUPTHER

## 2020-04-29 RX ORDER — DICYCLOMINE HCL 20 MG
20 TABLET ORAL
COMMUNITY
End: 2020-07-29 | Stop reason: SDUPTHER

## 2020-04-29 RX ORDER — DULOXETIN HYDROCHLORIDE 20 MG/1
20 CAPSULE, DELAYED RELEASE ORAL
COMMUNITY
End: 2020-10-14

## 2020-04-29 RX ORDER — CYCLOBENZAPRINE HCL 10 MG
10 TABLET ORAL
COMMUNITY
End: 2020-10-14

## 2020-04-29 RX ORDER — METOPROLOL SUCCINATE 25 MG/1
25 TABLET, EXTENDED RELEASE ORAL DAILY
COMMUNITY
Start: 2018-10-27 | End: 2020-09-22

## 2020-04-29 RX ORDER — ALBUTEROL SULFATE 90 UG/1
AEROSOL, METERED RESPIRATORY (INHALATION)
COMMUNITY
Start: 2020-04-28 | End: 2020-10-14

## 2020-04-29 RX ORDER — MULTIVITAMIN WITH IRON
1 TABLET ORAL DAILY
COMMUNITY
Start: 2018-09-25 | End: 2022-03-02

## 2020-04-29 RX ORDER — AMLODIPINE BESYLATE 10 MG/1
10 TABLET ORAL DAILY
COMMUNITY
Start: 2018-01-11

## 2020-04-29 RX ORDER — CHOLECALCIFEROL (VITAMIN D3) 50 MCG
2000 TABLET ORAL DAILY
COMMUNITY
Start: 2019-03-21

## 2020-04-29 RX ORDER — IPRATROPIUM BROMIDE AND ALBUTEROL SULFATE 2.5; .5 MG/3ML; MG/3ML
3 SOLUTION RESPIRATORY (INHALATION) 4 TIMES DAILY
COMMUNITY
Start: 2020-03-13

## 2020-04-29 RX ORDER — POLYETHYLENE GLYCOL 3350 17 G/17G
17 POWDER, FOR SOLUTION ORAL DAILY
COMMUNITY
End: 2020-09-22 | Stop reason: SDUPTHER

## 2020-04-29 RX ORDER — PROMETHAZINE HYDROCHLORIDE 25 MG/1
25 TABLET ORAL
COMMUNITY
Start: 2018-11-05

## 2020-04-29 RX ORDER — AZELASTINE 1 MG/ML
2 SPRAY, METERED NASAL DAILY
COMMUNITY
Start: 2019-04-17

## 2020-04-29 RX ADMIN — MEDROXYPROGESTERONE ACETATE 150 MG: 150 INJECTION, SUSPENSION INTRAMUSCULAR at 14:02

## 2020-07-29 ENCOUNTER — OFFICE VISIT (OUTPATIENT)
Dept: OBSTETRICS AND GYNECOLOGY | Facility: CLINIC | Age: 40
End: 2020-07-29

## 2020-07-29 VITALS
WEIGHT: 149 LBS | HEIGHT: 67 IN | SYSTOLIC BLOOD PRESSURE: 145 MMHG | HEART RATE: 63 BPM | BODY MASS INDEX: 23.39 KG/M2 | DIASTOLIC BLOOD PRESSURE: 95 MMHG

## 2020-07-29 DIAGNOSIS — Z01.419 ENCOUNTER FOR GYNECOLOGICAL EXAMINATION WITHOUT ABNORMAL FINDING: Primary | ICD-10-CM

## 2020-07-29 DIAGNOSIS — N93.9 ABNORMAL UTERINE BLEEDING (AUB): ICD-10-CM

## 2020-07-29 DIAGNOSIS — Z30.42 DEPO-PROVERA CONTRACEPTIVE STATUS: ICD-10-CM

## 2020-07-29 DIAGNOSIS — R87.810 ASCUS WITH POSITIVE HIGH RISK HPV CERVICAL: ICD-10-CM

## 2020-07-29 DIAGNOSIS — R87.610 ASCUS WITH POSITIVE HIGH RISK HPV CERVICAL: ICD-10-CM

## 2020-07-29 PROCEDURE — 96372 THER/PROPH/DIAG INJ SC/IM: CPT | Performed by: OBSTETRICS & GYNECOLOGY

## 2020-07-29 PROCEDURE — 99395 PREV VISIT EST AGE 18-39: CPT | Performed by: OBSTETRICS & GYNECOLOGY

## 2020-07-29 RX ORDER — MEDROXYPROGESTERONE ACETATE 150 MG/ML
150 INJECTION, SUSPENSION INTRAMUSCULAR
Qty: 1 ML | Refills: 3 | Status: SHIPPED | OUTPATIENT
Start: 2020-07-29 | End: 2020-10-14

## 2020-07-29 RX ORDER — DICYCLOMINE HCL 20 MG
20 TABLET ORAL EVERY 6 HOURS
Qty: 180 TABLET | Refills: 1 | Status: SHIPPED | OUTPATIENT
Start: 2020-07-29 | End: 2020-09-22

## 2020-07-29 RX ORDER — CHLORTHALIDONE 25 MG/1
25 TABLET ORAL DAILY
Qty: 30 TABLET | Refills: 1 | Status: SHIPPED | OUTPATIENT
Start: 2020-07-29

## 2020-07-29 RX ORDER — MEDROXYPROGESTERONE ACETATE 150 MG/ML
150 INJECTION, SUSPENSION INTRAMUSCULAR ONCE
Status: COMPLETED | OUTPATIENT
Start: 2020-07-29 | End: 2020-07-29

## 2020-07-29 RX ADMIN — MEDROXYPROGESTERONE ACETATE 150 MG: 150 INJECTION, SUSPENSION INTRAMUSCULAR at 15:05

## 2020-07-29 NOTE — PROGRESS NOTES
GYN Annual Exam     CC- Here for annual exam.     Brooklynn Perez is a 39 y.o. female who presents for annual well woman exam. Periods are absent due to Depo.   Pt here for Depo inj, pt did not have a reaction to the injection.     OB History        2    Para   2    Term                AB        Living   2       SAB        TAB        Ectopic        Molar        Multiple        Live Births                    Current contraception: tubal ligation  History of abnormal Pap smear: yes - no treatment required  Family history of uterine, colon or ovarian cancer: yes - colon in aunt and uncle   History of abnormal mammogram: no  Family history of breast cancer: no  Last Pap : 2019 NL HPV pos, neg     Past Medical History:   Diagnosis Date   • Asthma    • Bipolar disorder (CMS/HCC)    • GERD (gastroesophageal reflux disease)    • Graves disease    • Hyperlipidemia    • Hypertension    • IBS (irritable bowel syndrome)        Past Surgical History:   Procedure Laterality Date   • APPENDECTOMY     • COLONOSCOPY     • THYROIDECTOMY  2019   • TUBAL ABDOMINAL LIGATION           Current Outpatient Medications:   •  albuterol sulfate  (90 Base) MCG/ACT inhaler, , Disp: , Rfl:   •  amLODIPine (NORVASC) 10 MG tablet, Take 10 mg by mouth Daily., Disp: , Rfl:   •  azelastine (ASTELIN) 0.1 % nasal spray, 2 sprays into the nostril(s) as directed by provider Daily., Disp: , Rfl:   •  buPROPion SR (WELLBUTRIN SR) 150 MG 12 hr tablet, Take 150 mg by mouth., Disp: , Rfl:   •  Cetirizine HCl 10 MG capsule, Take 10 mg by mouth Daily., Disp: , Rfl:   •  chlorthalidone (HYGROTON) 25 MG tablet, Take 25 mg by mouth Daily., Disp: , Rfl:   •  Cholecalciferol (VITAMIN D) 50 MCG (2000 UT) tablet, Take 2,000 Units by mouth Daily., Disp: , Rfl:   •  cyclobenzaprine (FLEXERIL) 10 MG tablet, Take 10 mg by mouth., Disp: , Rfl:   •  diclofenac (VOLTAREN) 75 MG EC tablet, Take 75 mg by mouth., Disp: , Rfl:   •   dicyclomine (BENTYL) 20 MG tablet, Take 20 mg by mouth., Disp: , Rfl:   •  DULoxetine (CYMBALTA) 20 MG capsule, Take 20 mg by mouth., Disp: , Rfl:   •  EPINEPHrine (EPIPEN) 0.3 MG/0.3ML solution auto-injector injection, INJECT INTRAMUSCULARLY AS DIRECTED FOR ALLERGIC REACTION, Disp: , Rfl:   •  Fluticasone Furoate-Vilanterol (BREO ELLIPTA) 200-25 MCG/INH inhaler, Inhale 1 puff Daily., Disp: , Rfl:   •  ipratropium-albuterol (DUO-NEB) 0.5-2.5 mg/3 ml nebulizer, Inhale 3 mL 4 (Four) Times a Day., Disp: , Rfl:   •  medroxyPROGESTERone (DEPO-PROVERA) 150 MG/ML injection, Inject 150 mg into the appropriate muscle as directed by prescriber., Disp: , Rfl:   •  metoprolol succinate XL (TOPROL-XL) 25 MG 24 hr tablet, Take 25 mg by mouth Daily., Disp: , Rfl:   •  montelukast (SINGULAIR) 10 MG tablet, Take 10 mg by mouth Daily., Disp: , Rfl:   •  Multiple Vitamins tablet, Take 1 tablet by mouth Daily., Disp: , Rfl:   •  omeprazole (priLOSEC) 20 MG capsule, Take 20 mg by mouth Daily., Disp: , Rfl:   •  polyethylene glycol (MIRALAX) 17 GM/SCOOP powder, Take 17 g by mouth Daily., Disp: , Rfl:   •  promethazine (PHENERGAN) 25 MG tablet, Take 25 mg by mouth., Disp: , Rfl:   •  sodium chloride 0.65 % nasal spray, 1 spray into the nostril(s) as directed by provider., Disp: , Rfl:   •  SPIRIVA RESPIMAT 1.25 MCG/ACT aerosol solution inhaler, INHALE 2 PUFFS ONCE D, Disp: , Rfl: 3  •  SYNTHROID 125 MCG tablet, Take  by mouth Daily., Disp: , Rfl:   •  Tiotropium Bromide Monohydrate (SPIRIVA RESPIMAT) 1.25 MCG/ACT aerosol solution inhaler, Inhale 2 puffs Daily., Disp: , Rfl:     Allergies   Allergen Reactions   • Allyl Isothiocyanate Hives   • Diphenhydramine Hives   • Grape (Artificial) Flavor Hives   • Antihistamines, Diphenhydramine-Type Itching and Rash     Benadryl cream  Benadryl cream  Benadryl cream   • Latex Rash       Social History     Tobacco Use   • Smoking status: Former Smoker     Types: Cigars   • Smokeless tobacco: Never  "Used   Substance Use Topics   • Alcohol use: Yes     Comment: rarely   • Drug use: No       Family History   Problem Relation Age of Onset   • Colon cancer Maternal Aunt    • Colon cancer Maternal Uncle    • Breast cancer Neg Hx    • Ovarian cancer Neg Hx    • Uterine cancer Neg Hx    • Deep vein thrombosis Neg Hx    • Pulmonary embolism Neg Hx        Review of Systems   Constitutional: Negative for chills and fever.   Gastrointestinal: Negative for abdominal pain, constipation and diarrhea.   Genitourinary: Negative for menstrual problem, pelvic pain, vaginal bleeding and vaginal discharge.   All other systems reviewed and are negative.      /95   Pulse 63   Ht 170.2 cm (67\")   Wt 67.6 kg (149 lb)   Breastfeeding No   BMI 23.34 kg/m²     Physical Exam   Constitutional: She is oriented to person, place, and time. She appears well-developed and well-nourished. No distress. She is not obese.  HENT:   Head: Normocephalic and atraumatic.   Eyes: Conjunctivae are normal. Right eye exhibits no discharge. Left eye exhibits no discharge.   Neck: Normal range of motion. Neck supple. No thyromegaly present.   Cardiovascular: Normal rate, regular rhythm and normal heart sounds.   No murmur heard.  Pulmonary/Chest: Effort normal and breath sounds normal. No respiratory distress. Right breast exhibits no inverted nipple, no mass and no nipple discharge. Left breast exhibits no inverted nipple, no mass and no nipple discharge.   Abdominal: Soft. Bowel sounds are normal. She exhibits no distension. There is no tenderness.   Genitourinary: Vagina normal and cervix normal. Pelvic exam was performed with patient supine. There is no lesion or Bartholin's cyst on the right labia. There is no lesion or Bartholin's cyst on the left labia. Uterus is enlarged (8-9 cm) and anteverted. Uterus is not deviated, fixed or exhibiting a mass. Cervix does not exhibit motion tenderness or friability. Right adnexum displays no mass, no " tenderness and no fullness. Left adnexum displays no mass, no tenderness and no fullness. No bleeding in the vagina. No vaginal discharge found.   Musculoskeletal: Normal range of motion. She exhibits no edema.   Lymphadenopathy:     She has no cervical adenopathy.        Right: No inguinal adenopathy present.        Left: No inguinal adenopathy present.   Neurological: She is alert and oriented to person, place, and time.   Skin: Skin is warm and dry. No rash noted.   Psychiatric: She has a normal mood and affect. Her behavior is normal. Judgment and thought content normal.            Assessment     1) GYN annual well woman exam.   2) AUB doing well on depo-provera   To continue   3) ASCUS HPV +   Repeating today     Having trouble getting into PCP due to COVID-19, given a few refills.      Plan     1) Breast Health - Clinical breast exam yearly, Self breast awareness monthly  2) Pap - updated as above   3) Smoking status- non-smoker   4) Activity recommends - Adult 150-300 min/week of multi-component physical activities that include balance training, aerobic and physical strengthening.    Avoidance of distracted driving issues (texts, phone calls).   5) Follow up prn and one year.       Александр Vale MD   7/29/2020  13:54

## 2020-08-10 ENCOUNTER — TELEPHONE (OUTPATIENT)
Dept: OBSTETRICS AND GYNECOLOGY | Facility: CLINIC | Age: 40
End: 2020-08-10

## 2020-08-10 NOTE — TELEPHONE ENCOUNTER
----- Message from Александр Vale MD sent at 8/10/2020  9:34 AM EDT -----  Celine, She is aware. Please help her arrange colposcopy - Persistent HPV. Thanks, Dr. Vale

## 2020-08-10 NOTE — TELEPHONE ENCOUNTER
Patient called and stated that she received a letter in the mail dated 7/31 stating that her papsmear was negative and everythng looked normal.  She is calling HIGHLY upset because now she is receiving a phone call stating that she needs another appointment due to an abnormal result.     She is requesting a phone call back to discuss her results.

## 2020-08-10 NOTE — TELEPHONE ENCOUNTER
Celine,     I left her a message to call me back. We need to have a conversation with the [facility] Rep to see if that is true that they would send out a normal letter for someone with Normal cells, HPV + (for the third year in a row).     That would be a problem.   Can you check with Gloria.     Thanks   Dr. Vale

## 2020-08-10 NOTE — TELEPHONE ENCOUNTER
Celine,     Confusing situation   Seen in June 2019 for ASCUS HPV + pap   Colpo negative   Follow up pap in July, 2019 NIL HPV + 16/18 negative   Discussed recommendation with her this morning.   [facility] sent letter today - stating pap was negative.   However this is not completely accurate but has made her upset.     Also of note reports she is having pelvic pain - since she had colposcopy in June of 2019 (related to something we used per her report today)   Does not feel she can tolerate a repeat colposcopy     Wants something for this pain (muscle relaxant or pain pill)   May need to have office specific appointment to look into pelvic pain and see if GYN in nature or would respond to Hormonal suppression (she is contracepting by [facility] and had normal ultrasound in 2018)     - so please find out why [facility] sent normal letter!   - make appointment to follow up current pap with repeat in 6 months.   - offer appointment with us to consider work up of ongoing pelvic pain and treatment options available.     Александр Vale MD  8/10/2020  14:05

## 2020-09-22 ENCOUNTER — OFFICE VISIT (OUTPATIENT)
Dept: GASTROENTEROLOGY | Facility: CLINIC | Age: 40
End: 2020-09-22

## 2020-09-22 VITALS — WEIGHT: 147.6 LBS | BODY MASS INDEX: 23.17 KG/M2 | HEIGHT: 67 IN | TEMPERATURE: 98.2 F

## 2020-09-22 DIAGNOSIS — R10.10 PAIN OF UPPER ABDOMEN: ICD-10-CM

## 2020-09-22 DIAGNOSIS — K59.00 CONSTIPATION, UNSPECIFIED CONSTIPATION TYPE: ICD-10-CM

## 2020-09-22 DIAGNOSIS — K21.9 GASTROESOPHAGEAL REFLUX DISEASE, ESOPHAGITIS PRESENCE NOT SPECIFIED: Primary | ICD-10-CM

## 2020-09-22 PROCEDURE — 99214 OFFICE O/P EST MOD 30 MIN: CPT | Performed by: NURSE PRACTITIONER

## 2020-09-22 RX ORDER — METAXALONE 800 MG/1
TABLET ORAL
COMMUNITY
Start: 2020-09-10 | End: 2020-12-22

## 2020-09-22 RX ORDER — POLYETHYLENE GLYCOL 3350 17 G/17G
17 POWDER, FOR SOLUTION ORAL DAILY
Qty: 30 PACKET | Refills: 11 | Status: SHIPPED | OUTPATIENT
Start: 2020-09-22 | End: 2022-07-06 | Stop reason: SDUPTHER

## 2020-09-22 RX ORDER — FAMOTIDINE 20 MG/1
20 TABLET, FILM COATED ORAL 2 TIMES DAILY
Qty: 60 TABLET | Refills: 3 | Status: SHIPPED | OUTPATIENT
Start: 2020-09-22 | End: 2020-10-14

## 2020-09-22 NOTE — PROGRESS NOTES
Chief Complaint   Patient presents with   • Constipation       Brooklynn Perez is a  40 y.o. female here for a follow up visit for constipation.  HPI  40-year-old female presents today for follow-up visit for constipation.  She is a patient of Dr. Jolly.  She was last seen in the office on 4/15/2020 for a telephone visit.  She has a history of constipation and admits she does well when she takes MiraLAX every day.  Unfortunately she ran out of her supply and was trying to just make 5 packets last over a month.  So since that time she is really gotten backed up and had issues with constipation with abdominal pain nausea and vomiting.  She also has a history of GERD and admits she normally does well on omeprazole 20 mg once daily.  Unfortunately her insurance will no longer cover this.  So she has been without it.  She is tried other over-the-counter stuff that just really has not worked.  She denies any dysphagia, diarrhea, rectal bleeding or melena.  She was appetite is decreased and her weight looks to be stable.  Her last colonoscopy was in 2017.  Past Medical History:   Diagnosis Date   • Asthma    • Bipolar disorder (CMS/HCC)    • GERD (gastroesophageal reflux disease)    • Graves disease    • Hyperlipidemia    • Hypertension    • IBS (irritable bowel syndrome)        Past Surgical History:   Procedure Laterality Date   • APPENDECTOMY     • COLONOSCOPY  2017   • THYROIDECTOMY  08/2019   • TUBAL ABDOMINAL LIGATION         Scheduled Meds:    Continuous Infusions:No current facility-administered medications for this visit.       PRN Meds:.    Allergies   Allergen Reactions   • Allyl Isothiocyanate Hives   • Diphenhydramine Hives   • Grape (Artificial) Flavor Hives   • Antihistamines, Diphenhydramine-Type Itching and Rash     Benadryl cream  Benadryl cream  Benadryl cream   • Latex Rash       Social History     Socioeconomic History   • Marital status: Unknown     Spouse name: Not on file   • Number of children:  Not on file   • Years of education: Not on file   • Highest education level: Not on file   Tobacco Use   • Smoking status: Former Smoker     Types: Cigars   • Smokeless tobacco: Never Used   Substance and Sexual Activity   • Alcohol use: Yes     Comment: rarely   • Drug use: No   • Sexual activity: Not Currently     Partners: Male     Birth control/protection: Tubal ligation       Family History   Problem Relation Age of Onset   • Colon cancer Maternal Aunt    • Colon cancer Maternal Uncle    • Breast cancer Neg Hx    • Ovarian cancer Neg Hx    • Uterine cancer Neg Hx    • Deep vein thrombosis Neg Hx    • Pulmonary embolism Neg Hx        Review of Systems   Constitutional: Negative for appetite change, chills, diaphoresis, fatigue, fever and unexpected weight change.   HENT: Negative for nosebleeds, postnasal drip, sore throat, trouble swallowing and voice change.    Respiratory: Negative for cough, choking, chest tightness, shortness of breath and wheezing.    Cardiovascular: Negative for chest pain, palpitations and leg swelling.   Gastrointestinal: Positive for abdominal distention, abdominal pain and constipation. Negative for anal bleeding, blood in stool, diarrhea, nausea, rectal pain and vomiting.   Endocrine: Negative for polydipsia, polyphagia and polyuria.   Musculoskeletal: Negative for gait problem.   Skin: Negative for rash and wound.   Allergic/Immunologic: Negative for food allergies.   Neurological: Negative for dizziness, speech difficulty and light-headedness.   Psychiatric/Behavioral: Negative for confusion, self-injury, sleep disturbance and suicidal ideas.       Vitals:    09/22/20 1535   Temp: 98.2 °F (36.8 °C)       Physical Exam  Constitutional:       General: She is not in acute distress.     Appearance: She is well-developed. She is not ill-appearing.   HENT:      Head: Normocephalic.   Eyes:      Pupils: Pupils are equal, round, and reactive to light.   Cardiovascular:      Rate and  Rhythm: Normal rate and regular rhythm.      Heart sounds: Normal heart sounds.   Pulmonary:      Effort: Pulmonary effort is normal.      Breath sounds: Normal breath sounds.   Abdominal:      General: Bowel sounds are normal. There is distension.      Palpations: Abdomen is soft. There is no mass.      Tenderness: There is no abdominal tenderness. There is no guarding or rebound.      Hernia: No hernia is present.   Musculoskeletal: Normal range of motion.   Skin:     General: Skin is warm and dry.   Neurological:      Mental Status: She is alert and oriented to person, place, and time.   Psychiatric:         Speech: Speech normal.         Behavior: Behavior normal.         Judgment: Judgment normal.         No radiology results for the last 7 days     Assessment and plan    1. Gastroesophageal reflux disease, esophagitis presence not specified    2. Pain of upper abdomen    3. Constipation, unspecified constipation type    GERD was well controlled on omeprazole 20 mg once daily however with her insurance not covering it she is unable to afford it.  We will switch it to something I think her insurance will cover like Pepcid 20 mg twice daily.  Hopefully this will work.  Continue GERD precautions.  Constipation is well controlled with daily MiraLAX.  I will go ahead and refill this for her.  Overall she seems to be doing well.  Patient to call the office next week with an update.  Patient to follow-up with me in 3 months.

## 2020-10-06 ENCOUNTER — TELEPHONE (OUTPATIENT)
Dept: OBSTETRICS AND GYNECOLOGY | Facility: CLINIC | Age: 40
End: 2020-10-06

## 2020-10-06 NOTE — TELEPHONE ENCOUNTER
Pt called to inquire as to why she is scheduled for repeat pap in February.  States she received normal results and was not informed by our office of need for repeat pap.      Pt # [phone]

## 2020-10-06 NOTE — TELEPHONE ENCOUNTER
Shahnaz,     Not sure where the disconnect is about this.   See phone notes in August. - SHE IS AWARE   I personally reviewed situation with her. Her paps in 2018, 2019 with High risk HPV present. Yes the cells have not had any concerning changes, but the persistence of the HPV alone raises our concern about her developing abnormalities that could lead to cervical cancer.   When we discussed last month we reviewed colposcopy to look for those changes (prior negative exams) vs repeating her pap. We (both her and I) decided to repeat her pap at 6 months.      Unfortunately for reasons outside of our control, [facility] - will send a letter based on the cytology (review of the cells alone).  That letter for her was sent because again the cells themselves were normal last check. But further testing we follow up on, for her that is the HPV test referenced above.     So again - she is aware - both Celine and I have reviewed with her that the HPV is persistent and that is our concern.  I can not help that [facility] does not choose to use her complete pap results but instead focuses on the cells and not other tests.      I stand by our recommendation to repeat pap in Feb as scheduled. Her only other option is to get second opinion from another provider.     Thanks   Dr. Vale

## 2020-10-06 NOTE — TELEPHONE ENCOUNTER
"Relayed information to pt.  I think she cannot remember due to a scare during recent testing on her thyroid.  States she reacted badly to the iodine that office used, and she was worried we were going to be cutting or using \"the same stuff as last year\".  Explained to pt she will only receive a repeat pap in February, not a bx.  Pt was put at ease.  Will be in next week for depo.    "

## 2020-10-14 ENCOUNTER — CLINICAL SUPPORT (OUTPATIENT)
Dept: OBSTETRICS AND GYNECOLOGY | Facility: CLINIC | Age: 40
End: 2020-10-14

## 2020-10-14 VITALS
HEIGHT: 67 IN | BODY MASS INDEX: 23.39 KG/M2 | SYSTOLIC BLOOD PRESSURE: 113 MMHG | HEART RATE: 72 BPM | DIASTOLIC BLOOD PRESSURE: 77 MMHG | WEIGHT: 149 LBS

## 2020-10-14 DIAGNOSIS — Z30.42 DEPO-PROVERA CONTRACEPTIVE STATUS: Primary | ICD-10-CM

## 2020-10-14 PROCEDURE — 96372 THER/PROPH/DIAG INJ SC/IM: CPT | Performed by: OBSTETRICS & GYNECOLOGY

## 2020-10-14 RX ORDER — DICYCLOMINE HCL 20 MG
TABLET ORAL
COMMUNITY
End: 2022-05-25

## 2020-10-14 RX ORDER — LISINOPRIL 40 MG/1
TABLET ORAL
COMMUNITY

## 2020-10-14 RX ORDER — DULOXETIN HYDROCHLORIDE 60 MG/1
CAPSULE, DELAYED RELEASE ORAL
COMMUNITY
Start: 2020-10-06 | End: 2021-03-24

## 2020-10-14 RX ORDER — EPINEPHRINE 0.3 MG/.3ML
INJECTION SUBCUTANEOUS
COMMUNITY

## 2020-10-14 RX ORDER — MEDROXYPROGESTERONE ACETATE 150 MG/ML
INJECTION, SUSPENSION INTRAMUSCULAR
COMMUNITY
End: 2021-03-15 | Stop reason: SDUPTHER

## 2020-10-14 RX ORDER — CYCLOBENZAPRINE HCL 10 MG
TABLET ORAL
COMMUNITY
End: 2021-03-24

## 2020-10-14 RX ORDER — LEVOTHYROXINE SODIUM 0.1 MG/1
TABLET ORAL
COMMUNITY
End: 2020-12-22 | Stop reason: ALTCHOICE

## 2020-10-14 RX ORDER — MEDROXYPROGESTERONE ACETATE 150 MG/ML
150 INJECTION, SUSPENSION INTRAMUSCULAR ONCE
Status: COMPLETED | OUTPATIENT
Start: 2020-10-14 | End: 2020-10-14

## 2020-10-14 RX ORDER — DICLOFENAC SODIUM 75 MG/1
TABLET, DELAYED RELEASE ORAL
COMMUNITY
End: 2022-05-25

## 2020-10-14 RX ORDER — ECHINACEA PURPUREA EXTRACT 125 MG
TABLET ORAL
COMMUNITY

## 2020-10-14 RX ORDER — PROPRANOLOL HYDROCHLORIDE 20 MG/1
TABLET ORAL
COMMUNITY

## 2020-10-14 RX ORDER — FAMOTIDINE 20 MG/1
TABLET, FILM COATED ORAL
COMMUNITY
Start: 2020-09-22 | End: 2020-12-22 | Stop reason: SDUPTHER

## 2020-10-14 RX ORDER — BUPROPION HYDROCHLORIDE 150 MG/1
TABLET, EXTENDED RELEASE ORAL
COMMUNITY

## 2020-10-14 RX ORDER — HYDROCODONE BITARTRATE AND ACETAMINOPHEN 5; 325 MG/1; MG/1
1 TABLET ORAL 2 TIMES DAILY
COMMUNITY
Start: 2020-09-29 | End: 2022-05-25

## 2020-10-14 RX ORDER — TIOTROPIUM BROMIDE INHALATION SPRAY 1.56 UG/1
SPRAY, METERED RESPIRATORY (INHALATION)
COMMUNITY

## 2020-10-14 RX ORDER — LEVOTHYROXINE SODIUM 100 MCG
TABLET ORAL DAILY
COMMUNITY
Start: 2020-09-29 | End: 2022-03-02

## 2020-10-14 RX ORDER — ALBUTEROL SULFATE 90 UG/1
AEROSOL, METERED RESPIRATORY (INHALATION)
COMMUNITY
End: 2022-05-25

## 2020-10-14 RX ORDER — NICOTINE POLACRILEX 4 MG/1
1 GUM, CHEWING ORAL DAILY
COMMUNITY
End: 2021-03-24

## 2020-10-14 RX ADMIN — MEDROXYPROGESTERONE ACETATE 150 MG: 150 INJECTION, SUSPENSION INTRAMUSCULAR at 14:16

## 2020-12-22 ENCOUNTER — OFFICE VISIT (OUTPATIENT)
Dept: GASTROENTEROLOGY | Facility: CLINIC | Age: 40
End: 2020-12-22

## 2020-12-22 VITALS — BODY MASS INDEX: 24.52 KG/M2 | HEIGHT: 67 IN | WEIGHT: 156.2 LBS

## 2020-12-22 DIAGNOSIS — K21.9 GASTROESOPHAGEAL REFLUX DISEASE WITHOUT ESOPHAGITIS: ICD-10-CM

## 2020-12-22 DIAGNOSIS — K59.00 CONSTIPATION, UNSPECIFIED CONSTIPATION TYPE: Primary | ICD-10-CM

## 2020-12-22 DIAGNOSIS — R10.10 PAIN OF UPPER ABDOMEN: ICD-10-CM

## 2020-12-22 PROCEDURE — 99214 OFFICE O/P EST MOD 30 MIN: CPT | Performed by: NURSE PRACTITIONER

## 2020-12-22 RX ORDER — FAMOTIDINE 40 MG/1
40 TABLET, FILM COATED ORAL DAILY
Qty: 90 TABLET | Refills: 3 | Status: SHIPPED | OUTPATIENT
Start: 2020-12-22 | End: 2022-03-10

## 2020-12-22 NOTE — PROGRESS NOTES
Chief Complaint   Patient presents with   • Constipation   • Heartburn       Brooklynn Perez is a  40 y.o. female here for a follow up visit for constipation.    HPI  40-year-old female presents today for follow-up visit for constipation.  She is a patient of Dr. Jolly.  She was last seen in the office by me on 9/22/2020.  She has a history of constipation and she admits as long as she takes MiraLAX a couple times a week she does pretty good.  She does admit lately she has been more constipated.  She also has a history of GERD and admits the Pepcid 20 mg twice a day does not really seem to be working well.  She does feels like it needs to be stronger.  She did really good on omeprazole the problem is her passport insurance will not cover it.  She tells me she cannot afford to pay the over-the-counter cash pay price.  She denies any dysphagia, nausea and vomiting, diarrhea, rectal bleeding or melena.  She admits her appetite is good and her weight is stable.  Her last colonoscopy was in 2017.  She does have history of appendectomy and thyroidectomy.  Past Medical History:   Diagnosis Date   • Asthma    • Bipolar disorder (CMS/HCC)    • GERD (gastroesophageal reflux disease)    • Graves disease    • Hyperlipidemia    • Hypertension    • IBS (irritable bowel syndrome)        Past Surgical History:   Procedure Laterality Date   • APPENDECTOMY     • COLONOSCOPY  2017   • THYROIDECTOMY  08/2019   • TUBAL ABDOMINAL LIGATION         Scheduled Meds:    Continuous Infusions:No current facility-administered medications for this visit.       PRN Meds:.    Allergies   Allergen Reactions   • Allyl Isothiocyanate Hives   • Diphenhydramine Hives   • Grape (Artificial) Flavor Hives   • Antihistamines, Diphenhydramine-Type Itching and Rash     Benadryl cream  Benadryl cream  Benadryl cream   • Latex Rash       Social History     Socioeconomic History   • Marital status: Unknown     Spouse name: Not on file   • Number of children:  Not on file   • Years of education: Not on file   • Highest education level: Not on file   Tobacco Use   • Smoking status: Former Smoker     Types: Cigars   • Smokeless tobacco: Never Used   Substance and Sexual Activity   • Alcohol use: Yes     Comment: rarely   • Drug use: No   • Sexual activity: Not Currently     Partners: Male     Birth control/protection: Tubal ligation       Family History   Problem Relation Age of Onset   • Colon cancer Maternal Aunt    • Colon cancer Maternal Uncle    • Breast cancer Neg Hx    • Ovarian cancer Neg Hx    • Uterine cancer Neg Hx    • Deep vein thrombosis Neg Hx    • Pulmonary embolism Neg Hx        Review of Systems   Constitutional: Negative for appetite change, chills, diaphoresis, fatigue, fever and unexpected weight change.   HENT: Negative for nosebleeds, postnasal drip, sore throat, trouble swallowing and voice change.    Respiratory: Negative for cough, choking, chest tightness, shortness of breath and wheezing.    Cardiovascular: Negative for chest pain, palpitations and leg swelling.   Gastrointestinal: Negative for abdominal distention, abdominal pain, anal bleeding, blood in stool, constipation, diarrhea, nausea, rectal pain and vomiting.   Endocrine: Negative for polydipsia, polyphagia and polyuria.   Musculoskeletal: Negative for gait problem.   Skin: Negative for rash and wound.   Allergic/Immunologic: Negative for food allergies.   Neurological: Negative for dizziness, speech difficulty and light-headedness.   Psychiatric/Behavioral: Negative for confusion, self-injury, sleep disturbance and suicidal ideas.       There were no vitals filed for this visit.    Physical Exam  Constitutional:       General: She is not in acute distress.     Appearance: She is well-developed. She is not ill-appearing.   HENT:      Head: Normocephalic.   Eyes:      Pupils: Pupils are equal, round, and reactive to light.   Cardiovascular:      Rate and Rhythm: Normal rate and regular  rhythm.      Heart sounds: Normal heart sounds.   Pulmonary:      Effort: Pulmonary effort is normal.      Breath sounds: Normal breath sounds.   Abdominal:      General: Bowel sounds are normal. There is distension.      Palpations: Abdomen is soft. There is no mass.      Tenderness: There is no abdominal tenderness. There is no guarding or rebound.      Hernia: No hernia is present.   Musculoskeletal: Normal range of motion.   Skin:     General: Skin is warm and dry.   Neurological:      Mental Status: She is alert and oriented to person, place, and time.   Psychiatric:         Speech: Speech normal.         Behavior: Behavior normal.         Judgment: Judgment normal.         No radiology results for the last 7 days     Assessment and plan     1. Constipation, unspecified constipation type    2. Gastroesophageal reflux disease without esophagitis    3. Pain of upper abdomen    Constipation is well controlled when she remembers to take her MiraLAX every day.  She is to increase her water and exercise as tolerated.  GERD is still not well controlled on Pepcid 20 mg twice daily.  Since we are limited with her insurance coverage on what we can prescribe I will go ahead and try to change it to Pepcid 40 mg once daily and see if that is any better.  Continue GERD precautions.  Patient to call the office next week with an update.  Patient to follow-up with me in 3 months.

## 2021-01-06 ENCOUNTER — CLINICAL SUPPORT (OUTPATIENT)
Dept: OBSTETRICS AND GYNECOLOGY | Facility: CLINIC | Age: 41
End: 2021-01-06

## 2021-01-06 DIAGNOSIS — Z30.42 DEPO-PROVERA CONTRACEPTIVE STATUS: Primary | ICD-10-CM

## 2021-01-06 PROCEDURE — 96372 THER/PROPH/DIAG INJ SC/IM: CPT | Performed by: OBSTETRICS & GYNECOLOGY

## 2021-01-06 RX ORDER — MEDROXYPROGESTERONE ACETATE 150 MG/ML
150 INJECTION, SUSPENSION INTRAMUSCULAR ONCE
Status: COMPLETED | OUTPATIENT
Start: 2021-01-06 | End: 2021-01-06

## 2021-01-06 RX ADMIN — MEDROXYPROGESTERONE ACETATE 150 MG: 150 INJECTION, SUSPENSION INTRAMUSCULAR at 14:59

## 2021-01-06 NOTE — PROGRESS NOTES
Pt supplied Depo Provera 150 mg  IM right deltoid  Tolerated well, no reaction  NDC 2176-7590-32  LOT: [id number]  EXP: 03/2022

## 2021-01-18 RX ORDER — FAMOTIDINE 20 MG/1
TABLET, FILM COATED ORAL
Qty: 60 TABLET | Refills: 3 | OUTPATIENT
Start: 2021-01-18

## 2021-01-25 ENCOUNTER — TELEPHONE (OUTPATIENT)
Dept: GASTROENTEROLOGY | Facility: CLINIC | Age: 41
End: 2021-01-25

## 2021-01-25 NOTE — TELEPHONE ENCOUNTER
Call to pt.  States concerned that pcp ordering all meds for her.  Advise that SAUMYA Cavazos filled famotidine on 12/22/20 with #90, R3.  Verb understanding.     Pt will f/u with pcp if any other med questions.

## 2021-01-25 NOTE — TELEPHONE ENCOUNTER
----- Message from Nisreen Walker Rep sent at 1/22/2021  1:55 PM EST -----  Regarding: questions/med  Contact: [phone]  Pt is calling with some questions on her meds

## 2021-02-12 ENCOUNTER — TELEPHONE (OUTPATIENT)
Dept: OBSTETRICS AND GYNECOLOGY | Facility: CLINIC | Age: 41
End: 2021-02-12

## 2021-02-12 NOTE — TELEPHONE ENCOUNTER
Called pt about n/s on 2/10/21, pt received a message that  would not be in that day and did not call to r/s. Pt is now r/s'd.mary

## 2021-03-15 ENCOUNTER — TELEPHONE (OUTPATIENT)
Dept: OBSTETRICS AND GYNECOLOGY | Facility: CLINIC | Age: 41
End: 2021-03-15

## 2021-03-15 RX ORDER — MEDROXYPROGESTERONE ACETATE 150 MG/ML
150 INJECTION, SUSPENSION INTRAMUSCULAR
Qty: 0.9 ML | Refills: 3 | Status: SHIPPED | OUTPATIENT
Start: 2021-03-15 | End: 2021-09-15 | Stop reason: SDUPTHER

## 2021-03-22 ENCOUNTER — OFFICE VISIT (OUTPATIENT)
Dept: GASTROENTEROLOGY | Facility: CLINIC | Age: 41
End: 2021-03-22

## 2021-03-22 VITALS — WEIGHT: 143 LBS | BODY MASS INDEX: 22.44 KG/M2 | HEIGHT: 67 IN | TEMPERATURE: 97.5 F

## 2021-03-22 DIAGNOSIS — K59.00 CONSTIPATION, UNSPECIFIED CONSTIPATION TYPE: Primary | ICD-10-CM

## 2021-03-22 DIAGNOSIS — R10.10 PAIN OF UPPER ABDOMEN: ICD-10-CM

## 2021-03-22 DIAGNOSIS — K21.9 GASTROESOPHAGEAL REFLUX DISEASE WITHOUT ESOPHAGITIS: ICD-10-CM

## 2021-03-22 PROCEDURE — 99214 OFFICE O/P EST MOD 30 MIN: CPT | Performed by: NURSE PRACTITIONER

## 2021-03-22 NOTE — PROGRESS NOTES
Chief Complaint   Patient presents with   • Constipation   • Heartburn       Brooklynn Perez is a  40 y.o. female here for a follow up visit for constipation.    HPI  40-year-old female presents today for follow-up visit for constipation.  She is a patient of Dr. Jolly.  She was last seen in the office by me on 12/22/2020.  She has a history of constipation and admits she is currently doing really well with MiraLAX every day.  She has recently changed her diet trying to lose weight and support her sisters weight loss efforts and admits since changing her diet and eating only fruits and vegetables her bowels have actually been moving much better.  She tells me the bloating has completely resolved.  She is no longer having any upper abdominal pain.  She also has a history of GERD and admits she does well as long she takes Pepcid 40 mg once daily.  She tells me ever since she increased the Pepcid to 40 mg at night her bowels been moving much better the next morning.  She is not sure if that is due to the Pepcid or her change in diet or even the MiraLAX.  But what ever is doing it she is quite happy about it.  She does me this is the best her bowels been moving in a long time.  She denies any dysphagia, reflux, nausea and vomiting, rectal bleeding or melena.  She is appetite is good and her weight has dropped 13 pounds since December.  Her last colonoscopy was in 2017.  She does have history of appendectomy.  Past Medical History:   Diagnosis Date   • Asthma    • Bipolar disorder (CMS/HCC)    • GERD (gastroesophageal reflux disease)    • Graves disease    • Hyperlipidemia    • Hypertension    • IBS (irritable bowel syndrome)        Past Surgical History:   Procedure Laterality Date   • APPENDECTOMY     • COLONOSCOPY  2017   • THYROIDECTOMY  08/2019   • TUBAL ABDOMINAL LIGATION         Scheduled Meds:    Continuous Infusions:No current facility-administered medications for this visit.      PRN Meds:.    Allergies    Allergen Reactions   • Allyl Isothiocyanate Hives   • Diphenhydramine Hives   • Grape (Artificial) Flavor Hives   • Antihistamines, Diphenhydramine-Type Itching and Rash     Benadryl cream  Benadryl cream  Benadryl cream   • Latex Rash       Social History     Socioeconomic History   • Marital status: Unknown     Spouse name: Not on file   • Number of children: Not on file   • Years of education: Not on file   • Highest education level: Not on file   Tobacco Use   • Smoking status: Former Smoker     Types: Cigars   • Smokeless tobacco: Never Used   Substance and Sexual Activity   • Alcohol use: Yes     Comment: rarely   • Drug use: No   • Sexual activity: Not Currently     Partners: Male     Birth control/protection: Tubal ligation       Family History   Problem Relation Age of Onset   • Colon cancer Maternal Aunt    • Colon cancer Maternal Uncle    • Breast cancer Neg Hx    • Ovarian cancer Neg Hx    • Uterine cancer Neg Hx    • Deep vein thrombosis Neg Hx    • Pulmonary embolism Neg Hx        Review of Systems   Constitutional: Negative for appetite change, chills, diaphoresis, fatigue, fever and unexpected weight change.   HENT: Negative for nosebleeds, postnasal drip, sore throat, trouble swallowing and voice change.    Respiratory: Negative for cough, choking, chest tightness, shortness of breath and wheezing.    Cardiovascular: Negative for chest pain, palpitations and leg swelling.   Gastrointestinal: Negative for abdominal distention, abdominal pain, anal bleeding, blood in stool, constipation, diarrhea, nausea, rectal pain and vomiting.   Endocrine: Negative for polydipsia, polyphagia and polyuria.   Musculoskeletal: Negative for gait problem.   Skin: Negative for rash and wound.   Allergic/Immunologic: Negative for food allergies.   Neurological: Negative for dizziness, speech difficulty and light-headedness.   Psychiatric/Behavioral: Negative for confusion, self-injury, sleep disturbance and suicidal  ideas.       Vitals:    03/22/21 1309   Temp: 97.5 °F (36.4 °C)       Physical Exam  Constitutional:       General: She is not in acute distress.     Appearance: She is well-developed. She is not ill-appearing.   HENT:      Head: Normocephalic.   Eyes:      Pupils: Pupils are equal, round, and reactive to light.   Cardiovascular:      Rate and Rhythm: Normal rate and regular rhythm.      Heart sounds: Normal heart sounds.   Pulmonary:      Effort: Pulmonary effort is normal.      Breath sounds: Normal breath sounds.   Abdominal:      General: Bowel sounds are normal. There is no distension.      Palpations: Abdomen is soft. There is no mass.      Tenderness: There is no abdominal tenderness. There is no guarding or rebound.      Hernia: No hernia is present.   Musculoskeletal:         General: Normal range of motion.   Skin:     General: Skin is warm and dry.   Neurological:      Mental Status: She is alert and oriented to person, place, and time.   Psychiatric:         Speech: Speech normal.         Behavior: Behavior normal.         Judgment: Judgment normal.         No radiology results for the last 7 days     Assessment and plan    1. Constipation, unspecified constipation type    2. Pain of upper abdomen    3. Gastroesophageal reflux disease without esophagitis      Constipation seems well controlled on her current bowel regimen of daily MiraLAX.  GERD seems well controlled on Pepcid 40 mg before dinner.  Continue GERD precautions.  Sounds like her current diet changes have really helped her constipation as well as she has lost some weight and taking care of her gas and bloating issues.  Patient to call the office with any issues.  Patient to follow-up in 6 months.  Patient is agreeable to the plan.

## 2021-03-24 ENCOUNTER — OFFICE VISIT (OUTPATIENT)
Dept: OBSTETRICS AND GYNECOLOGY | Facility: CLINIC | Age: 41
End: 2021-03-24

## 2021-03-24 ENCOUNTER — TELEPHONE (OUTPATIENT)
Dept: GASTROENTEROLOGY | Facility: CLINIC | Age: 41
End: 2021-03-24

## 2021-03-24 VITALS
WEIGHT: 146 LBS | BODY MASS INDEX: 22.91 KG/M2 | DIASTOLIC BLOOD PRESSURE: 83 MMHG | SYSTOLIC BLOOD PRESSURE: 114 MMHG | HEIGHT: 67 IN

## 2021-03-24 DIAGNOSIS — R87.810 CERVICAL HIGH RISK HPV (HUMAN PAPILLOMAVIRUS) TEST POSITIVE: Primary | ICD-10-CM

## 2021-03-24 PROCEDURE — 99213 OFFICE O/P EST LOW 20 MIN: CPT | Performed by: OBSTETRICS & GYNECOLOGY

## 2021-03-24 RX ORDER — FERROUS SULFATE 325(65) MG
1 TABLET ORAL DAILY
COMMUNITY
Start: 2021-03-15

## 2021-03-24 RX ORDER — DULOXETIN HYDROCHLORIDE 60 MG/1
CAPSULE, DELAYED RELEASE ORAL
COMMUNITY
End: 2021-12-08

## 2021-03-24 RX ORDER — METHOCARBAMOL 750 MG/1
750 TABLET, FILM COATED ORAL 3 TIMES DAILY PRN
COMMUNITY
Start: 2021-03-10 | End: 2021-12-08

## 2021-03-24 RX ORDER — METOPROLOL SUCCINATE 50 MG/1
50 TABLET, EXTENDED RELEASE ORAL DAILY
COMMUNITY
Start: 2021-03-16

## 2021-03-24 RX ORDER — METOPROLOL SUCCINATE 25 MG/1
25 TABLET, EXTENDED RELEASE ORAL DAILY
COMMUNITY
Start: 2021-01-17 | End: 2021-12-08

## 2021-03-24 RX ORDER — MEDROXYPROGESTERONE ACETATE 150 MG/ML
INJECTION, SUSPENSION INTRAMUSCULAR
COMMUNITY
Start: 2021-01-02 | End: 2021-09-15 | Stop reason: SDUPTHER

## 2021-03-24 RX ORDER — ROPINIROLE 0.25 MG/1
TABLET, FILM COATED ORAL
COMMUNITY
End: 2022-05-25

## 2021-03-24 RX ORDER — DULOXETIN HYDROCHLORIDE 20 MG/1
20 CAPSULE, DELAYED RELEASE ORAL 2 TIMES DAILY
COMMUNITY
Start: 2021-01-13

## 2021-03-24 RX ORDER — POTASSIUM CHLORIDE 750 MG/1
10 TABLET, FILM COATED, EXTENDED RELEASE ORAL DAILY
COMMUNITY
Start: 2021-02-24 | End: 2022-11-21

## 2021-03-24 RX ORDER — OMEPRAZOLE 20 MG/1
20 CAPSULE, DELAYED RELEASE ORAL DAILY
COMMUNITY
Start: 2021-02-15 | End: 2022-03-15 | Stop reason: SDUPTHER

## 2021-03-24 NOTE — TELEPHONE ENCOUNTER
----- Message from MOOKIE Way sent at 3/22/2021  1:56 PM EDT -----  Patient needs a letter on our letterhead just stating when she established care here as a patient and that she is still currently being treated by us.  This is to go towards her disability case.  Once its completed please call her and she is happy to come by later this week and pick it up.  Thanks

## 2021-03-24 NOTE — PROGRESS NOTES
"Subjective    is a 40 y.o. female here for repeat pap smear. Last pap showed NL with HPV pos, neg 16/18. Pt was instructed to have a Colposcopy, but did not feel comfortable due to prior experiences.    Chief Complaint   Patient presents with   • Follow-up     Repeat pap - last pap 7/29/20 negative, HPV positive        HPI      Review of Systems     Objective   /83   Ht 170.2 cm (67\")   Wt 66.2 kg (146 lb)   BMI 22.87 kg/m²   OBGyn Exam     Assessment/Plan       Александр Vale MD   3/24/2021  09:03 EDT  "

## 2021-03-24 NOTE — TELEPHONE ENCOUNTER
----- Message from Nisreen Hylton sent at 3/24/2021  9:10 AM EDT -----  Regarding: disability statement  Contact: [phone]  Pt is checking status of disability statement. Please call to advise pt. Thank you

## 2021-03-24 NOTE — PROGRESS NOTES
"Subjective    is a 40 y.o. female.     Chief Complaint   Patient presents with   • Follow-up     Repeat pap - last pap 20 negative, HPV positive     Persistent pap smear issues.     HPI     40 y.o.    Pap in Epic from - NIL HPV-  Pap 18 - ASCUS, HPV +  Colpo /- Negative cervical biopsy   Pap 19 - NIL HPV +, 16/18 negative   Colpo 19- Negative cervical biopsy   Pap 20 - NIL HPV + 16/18 negative   Declined doing anything further because [facility] sent letter that test was normal. Explained they do that based on cytology alone, not on the HPV portion of the exam.  Here to repeat as has been + for 2+ years last summer.       Review of Systems   Constitutional: Negative for unexpected weight change.   Gastrointestinal: Negative for abdominal pain.   Genitourinary: Negative for pelvic pain.        Objective   /83   Ht 170.2 cm (67\")   Wt 66.2 kg (146 lb)   BMI 22.87 kg/m²   Physical Exam  Constitutional:       General: She is not in acute distress.     Appearance: Normal appearance. She is well-developed and normal weight.   Genitourinary:      Pelvic exam was performed with patient supine.      Vagina, uterus, right adnexa and left adnexa normal.      No vulval lesion or Bartholin's cyst noted.      No inguinal adenopathy present in the right or left side.     No vaginal discharge or bleeding.      No cervical motion tenderness or friability.      Uterus is not enlarged or tender.      No uterine mass detected.     Uterus is anteverted and regular.      No right or left adnexal mass present.      Right adnexa not tender or full.      Left adnexa not tender or full.   Abdominal:      General: Abdomen is flat. There is no distension.      Palpations: Abdomen is soft.      Tenderness: There is no abdominal tenderness.   Lymphadenopathy:      Lower Body: No right inguinal adenopathy. No left inguinal adenopathy.   Neurological:      Mental Status: She is alert.   Vitals reviewed. " "Exam conducted with a chaperone present.          Assessment/Plan   Diagnoses and all orders for this visit:    1. Cervical high risk HPV (human papillomavirus) test positive (Primary)  -     IGP, Apt HPV,rfx 16 / 18,45    Worsening issue.   Has been HPV + for at least 2 years.   Last colposcopy 2019  Seen for pap last July and when had \"conflicting\" information from [facility] and us, we repeated pap at 6 months. Presents today to proceed with recheck for HPV.   Discussed natural history of HPV and will need evaluation if still present on pap today.   Reviewed expectations with patient.     Александр Vale MD   3/24/2021  09:05 EDT    "

## 2021-03-24 NOTE — TELEPHONE ENCOUNTER
Called pt and pt reports that she is trying to appeal her disability.  She states she needs a letter stating that she is a pt in this office and has been seen since 2018.  Pt states it does not need to say anything about her condition. ADvised will send message to Megha GODFREY.

## 2021-03-26 LAB
CYTOLOGIST CVX/VAG CYTO: NORMAL
CYTOLOGY CVX/VAG DOC CYTO: NORMAL
CYTOLOGY CVX/VAG DOC THIN PREP: NORMAL
DX ICD CODE: NORMAL
HIV 1 & 2 AB SER-IMP: NORMAL
HPV I/H RISK 4 DNA CVX QL PROBE+SIG AMP: NEGATIVE
OTHER STN SPEC: NORMAL
STAT OF ADQ CVX/VAG CYTO-IMP: NORMAL

## 2021-03-29 ENCOUNTER — TELEPHONE (OUTPATIENT)
Dept: OBSTETRICS AND GYNECOLOGY | Facility: CLINIC | Age: 41
End: 2021-03-29

## 2021-03-29 NOTE — TELEPHONE ENCOUNTER
----- Message from Afshan Ashford MD sent at 3/29/2021  5:34 AM EDT -----  Please contact patient and let her know that her pap smear was normal and HPV testing was negative. Given her history of abnormals, Dr. Vale may want to repeat next year.  Thanks!    03/29/21  Called patient to inform results, no answer. Left a message to return call.

## 2021-03-31 ENCOUNTER — CLINICAL SUPPORT (OUTPATIENT)
Dept: OBSTETRICS AND GYNECOLOGY | Facility: CLINIC | Age: 41
End: 2021-03-31

## 2021-03-31 DIAGNOSIS — Z30.42 ENCOUNTER FOR MANAGEMENT AND INJECTION OF DEPO-PROVERA: Primary | ICD-10-CM

## 2021-03-31 PROCEDURE — 96372 THER/PROPH/DIAG INJ SC/IM: CPT | Performed by: OBSTETRICS & GYNECOLOGY

## 2021-03-31 RX ORDER — MEDROXYPROGESTERONE ACETATE 150 MG/ML
150 INJECTION, SUSPENSION INTRAMUSCULAR ONCE
Status: COMPLETED | OUTPATIENT
Start: 2021-03-31 | End: 2021-03-31

## 2021-03-31 RX ADMIN — MEDROXYPROGESTERONE ACETATE 150 MG: 150 INJECTION, SUSPENSION INTRAMUSCULAR at 13:19

## 2021-04-05 ENCOUNTER — TELEPHONE (OUTPATIENT)
Dept: OBSTETRICS AND GYNECOLOGY | Facility: CLINIC | Age: 41
End: 2021-04-05

## 2021-04-05 NOTE — TELEPHONE ENCOUNTER
Celine,     Pap was great. NIL, HPV negative.   Still would joel her to repeat next year (pap and HPV)- just to be safe.   Can you remind me when time comes.     Thanks   Dr. Vale

## 2021-04-05 NOTE — TELEPHONE ENCOUNTER
----- Message from Александр Vale MD sent at 3/24/2021  9:24 AM EDT -----  Need to follow up on HPV on pap - Had + in 2018 - Still positive in 2019 and 2020, so repeated today.

## 2021-06-23 ENCOUNTER — CLINICAL SUPPORT (OUTPATIENT)
Dept: OBSTETRICS AND GYNECOLOGY | Facility: CLINIC | Age: 41
End: 2021-06-23

## 2021-06-23 VITALS
HEIGHT: 67 IN | DIASTOLIC BLOOD PRESSURE: 107 MMHG | BODY MASS INDEX: 22.44 KG/M2 | WEIGHT: 143 LBS | SYSTOLIC BLOOD PRESSURE: 149 MMHG | HEART RATE: 76 BPM

## 2021-06-23 DIAGNOSIS — Z30.42 ENCOUNTER FOR MANAGEMENT AND INJECTION OF DEPO-PROVERA: Primary | ICD-10-CM

## 2021-06-23 PROCEDURE — 96372 THER/PROPH/DIAG INJ SC/IM: CPT | Performed by: OBSTETRICS & GYNECOLOGY

## 2021-06-23 RX ORDER — MEDROXYPROGESTERONE ACETATE 150 MG/ML
150 INJECTION, SUSPENSION INTRAMUSCULAR ONCE
Status: COMPLETED | OUTPATIENT
Start: 2021-06-23 | End: 2021-06-23

## 2021-06-23 RX ADMIN — MEDROXYPROGESTERONE ACETATE 150 MG: 150 INJECTION, SUSPENSION INTRAMUSCULAR at 13:06

## 2021-08-13 ENCOUNTER — TRANSCRIBE ORDERS (OUTPATIENT)
Dept: ADMINISTRATIVE | Facility: HOSPITAL | Age: 41
End: 2021-08-13

## 2021-08-13 DIAGNOSIS — R13.10 DYSPHAGIA, UNSPECIFIED TYPE: Primary | ICD-10-CM

## 2021-08-31 ENCOUNTER — HOSPITAL ENCOUNTER (OUTPATIENT)
Dept: GENERAL RADIOLOGY | Facility: HOSPITAL | Age: 41
Discharge: HOME | End: 2021-08-31
Admitting: OTOLARYNGOLOGY

## 2021-08-31 DIAGNOSIS — R13.10 DYSPHAGIA, UNSPECIFIED TYPE: ICD-10-CM

## 2021-08-31 PROCEDURE — 74220 X-RAY XM ESOPHAGUS 1CNTRST: CPT

## 2021-08-31 PROCEDURE — 74221 X-RAY XM ESOPHAGUS 2CNTRST: CPT

## 2021-08-31 RX ADMIN — BARIUM SULFATE 700 MG: 700 TABLET ORAL at 10:25

## 2021-08-31 RX ADMIN — BARIUM SULFATE 100 ML: 960 POWDER, FOR SUSPENSION ORAL at 10:25

## 2021-08-31 RX ADMIN — BARIUM SULFATE 80 ML: 980 POWDER, FOR SUSPENSION ORAL at 10:25

## 2021-08-31 RX ADMIN — ANTACID/ANTIFLATULENT 1 PACKET: 380; 550; 10; 10 GRANULE, EFFERVESCENT ORAL at 10:25

## 2021-09-15 ENCOUNTER — CLINICAL SUPPORT (OUTPATIENT)
Dept: OBSTETRICS AND GYNECOLOGY | Facility: CLINIC | Age: 41
End: 2021-09-15

## 2021-09-15 DIAGNOSIS — Z30.42 ENCOUNTER FOR MANAGEMENT AND INJECTION OF DEPO-PROVERA: Primary | ICD-10-CM

## 2021-09-15 PROCEDURE — 96372 THER/PROPH/DIAG INJ SC/IM: CPT | Performed by: OBSTETRICS & GYNECOLOGY

## 2021-09-15 RX ORDER — MEDROXYPROGESTERONE ACETATE 150 MG/ML
150 INJECTION, SUSPENSION INTRAMUSCULAR ONCE
Status: COMPLETED | OUTPATIENT
Start: 2021-09-15 | End: 2021-09-15

## 2021-09-15 RX ADMIN — MEDROXYPROGESTERONE ACETATE 150 MG: 150 INJECTION, SUSPENSION INTRAMUSCULAR at 13:11

## 2021-09-15 NOTE — PROGRESS NOTES
Pt supplied depo provera 150 mg  IM right deltoid   Tolerated well, no reaction  NDC 5911-7169-48  LOT: [id number]  EXP: 04/2022

## 2021-09-22 ENCOUNTER — OFFICE VISIT (OUTPATIENT)
Dept: GASTROENTEROLOGY | Facility: CLINIC | Age: 41
End: 2021-09-22

## 2021-09-22 VITALS
DIASTOLIC BLOOD PRESSURE: 84 MMHG | RESPIRATION RATE: 16 BRPM | SYSTOLIC BLOOD PRESSURE: 118 MMHG | OXYGEN SATURATION: 99 % | HEIGHT: 67 IN | BODY MASS INDEX: 22.39 KG/M2 | HEART RATE: 74 BPM

## 2021-09-22 DIAGNOSIS — K21.9 GASTROESOPHAGEAL REFLUX DISEASE, UNSPECIFIED WHETHER ESOPHAGITIS PRESENT: ICD-10-CM

## 2021-09-22 DIAGNOSIS — M79.10 MUSCLE ACHE: ICD-10-CM

## 2021-09-22 DIAGNOSIS — E87.6 HYPOKALEMIA: ICD-10-CM

## 2021-09-22 DIAGNOSIS — K59.01 SLOW TRANSIT CONSTIPATION: Primary | ICD-10-CM

## 2021-09-22 PROCEDURE — 99213 OFFICE O/P EST LOW 20 MIN: CPT | Performed by: PHYSICIAN ASSISTANT

## 2021-09-22 NOTE — PROGRESS NOTES
"Chief Complaint  Constipation    Subjective          History of Present Illness    Brooklynn Perez is a  41 y.o. female presents for follow-up visit for constipation.  She is a patient of Dr. Jolly.  She was last seen in the office by Megha on 3/22/2021.    At that time she was doing well with healthy diet, MiraLAX.  Today she reports that she has had worsening constipation times last week.  This started when she was stung by some bees and was placed on prednisone by the emergency department.  Then she had 2 spiders bite her and and this caused some arm pain so she took some her pain medicines.  She is not had a bowel movement in about 5 days.  She did take a half a dose of MiraLAX yesterday.  Prior to this episode she actually was doing fairly well with her constipation.  Denies melena, hematochezia.  Mid to abdominal bloating and discomfort due to the constipation.    Also has GERD and takes Pepcid 40 mg once daily.    She also mentions that she has had muscle cramping and aches ever since starting the prednisone.  She does have a history of hypokalemia and was previously treated with potassium by Dr. Urbina.  She is no longer on potassium as he told her she could come off.      EGD and colonoscopy 8/17 performed by Dr. Jermaine Ascencio-EGD with mild gastritis, normal colonoscopy-no biopsies taken.    She has family history of colon cancer in her maternal aunt and maternal uncle.    Objective   Vital Signs:   /84 (BP Location: Left arm, Patient Position: Sitting, Cuff Size: Adult)   Pulse 74   Resp 16   Ht 170.2 cm (67.01\")   SpO2 99%   BMI 22.39 kg/m²       Physical Exam  Vitals reviewed.   Constitutional:       General: She is awake. She is not in acute distress.     Appearance: Normal appearance. She is well-developed and well-groomed.   HENT:      Head: Normocephalic and atraumatic.      Mouth/Throat:      Mouth: Mucous membranes are moist.   Cardiovascular:      Rate and Rhythm: Normal rate and " regular rhythm.      Heart sounds: Normal heart sounds.   Pulmonary:      Effort: Pulmonary effort is normal.      Breath sounds: Normal breath sounds.   Abdominal:      General: Abdomen is flat. Bowel sounds are normal. There is no distension.      Palpations: Abdomen is soft. There is no mass.      Tenderness: There is no abdominal tenderness. There is no guarding or rebound.   Skin:     General: Skin is warm and dry.   Neurological:      Mental Status: She is alert and oriented to person, place, and time.      Gait: Gait normal.   Psychiatric:         Mood and Affect: Affect normal.         Speech: Speech normal.         Behavior: Behavior is cooperative.         Judgment: Judgment normal.          Result Review :             Assessment and Plan    Diagnoses and all orders for this visit:    1. Slow transit constipation (Primary)  -     CBC & Differential  -     Comprehensive Metabolic Panel    2. Gastroesophageal reflux disease, unspecified whether esophagitis present  -     CBC & Differential  -     Comprehensive Metabolic Panel    3. Muscle ache  -     CBC & Differential  -     Comprehensive Metabolic Panel    Recommend starting miralax BID until BM elicited then reduce down to once daily. Advised to take extra miralax every time she has a pain pill.    We will also check CBC and CMP given her muscle aches and cramping to check for low potassium and other deficiencies that may cause the symptoms.      Follow Up   No follow-ups on file.    Dragon dictation used throughout this note.     Maggy Brooke PA-C

## 2021-09-23 ENCOUNTER — TELEPHONE (OUTPATIENT)
Dept: GASTROENTEROLOGY | Facility: CLINIC | Age: 41
End: 2021-09-23

## 2021-09-23 LAB
ALBUMIN SERPL-MCNC: 4.8 G/DL (ref 3.5–5.2)
ALBUMIN/GLOB SERPL: 1.9 G/DL
ALP SERPL-CCNC: 95 U/L (ref 39–117)
ALT SERPL-CCNC: 8 U/L (ref 1–33)
AST SERPL-CCNC: 13 U/L (ref 1–32)
BASOPHILS # BLD AUTO: 0.01 10*3/MM3 (ref 0–0.2)
BASOPHILS NFR BLD AUTO: 0.1 % (ref 0–1.5)
BILIRUB SERPL-MCNC: 0.4 MG/DL (ref 0–1.2)
BUN SERPL-MCNC: 16 MG/DL (ref 6–20)
BUN/CREAT SERPL: 15.8 (ref 7–25)
CALCIUM SERPL-MCNC: 10.1 MG/DL (ref 8.6–10.5)
CHLORIDE SERPL-SCNC: 109 MMOL/L (ref 98–107)
CO2 SERPL-SCNC: 23.8 MMOL/L (ref 22–29)
CREAT SERPL-MCNC: 1.01 MG/DL (ref 0.57–1)
EOSINOPHIL # BLD AUTO: 0 10*3/MM3 (ref 0–0.4)
EOSINOPHIL NFR BLD AUTO: 0 % (ref 0.3–6.2)
ERYTHROCYTE [DISTWIDTH] IN BLOOD BY AUTOMATED COUNT: 13.3 % (ref 12.3–15.4)
GLOBULIN SER CALC-MCNC: 2.5 GM/DL
GLUCOSE SERPL-MCNC: 134 MG/DL (ref 65–99)
HCT VFR BLD AUTO: 45.9 % (ref 34–46.6)
HGB BLD-MCNC: 15.4 G/DL (ref 12–15.9)
IMM GRANULOCYTES # BLD AUTO: 0.02 10*3/MM3 (ref 0–0.05)
IMM GRANULOCYTES NFR BLD AUTO: 0.2 % (ref 0–0.5)
LYMPHOCYTES # BLD AUTO: 0.94 10*3/MM3 (ref 0.7–3.1)
LYMPHOCYTES NFR BLD AUTO: 11.7 % (ref 19.6–45.3)
MCH RBC QN AUTO: 32.2 PG (ref 26.6–33)
MCHC RBC AUTO-ENTMCNC: 33.6 G/DL (ref 31.5–35.7)
MCV RBC AUTO: 95.8 FL (ref 79–97)
MONOCYTES # BLD AUTO: 0.51 10*3/MM3 (ref 0.1–0.9)
MONOCYTES NFR BLD AUTO: 6.4 % (ref 5–12)
NEUTROPHILS # BLD AUTO: 6.54 10*3/MM3 (ref 1.7–7)
NEUTROPHILS NFR BLD AUTO: 81.6 % (ref 42.7–76)
NRBC BLD AUTO-RTO: 0 /100 WBC (ref 0–0.2)
PLATELET # BLD AUTO: 299 10*3/MM3 (ref 140–450)
POTASSIUM SERPL-SCNC: 4.6 MMOL/L (ref 3.5–5.2)
PROT SERPL-MCNC: 7.3 G/DL (ref 6–8.5)
RBC # BLD AUTO: 4.79 10*6/MM3 (ref 3.77–5.28)
SODIUM SERPL-SCNC: 145 MMOL/L (ref 136–145)
WBC # BLD AUTO: 8.02 10*3/MM3 (ref 3.4–10.8)

## 2021-09-23 NOTE — TELEPHONE ENCOUNTER
----- Message from Maggy Brooke PA-C sent at 9/23/2021 12:49 PM EDT -----  Please let patient know that her labs look pretty good.  There is no electrolyte deficiencies.  And her potassium is normal.  Her kidney functions are borderline elevated so make sure that she is pushing fluids to hydrate her kidneys well.

## 2021-11-02 NOTE — TELEPHONE ENCOUNTER
----- Message from Александр Vale MD sent at 4/23/2018 10:24 AM EDT -----  Celine, biopsy negative. Pap in one year. Please let her know, put on list for recall. Thanks Dr. Vale   18.8

## 2021-12-08 ENCOUNTER — CLINICAL SUPPORT (OUTPATIENT)
Dept: OBSTETRICS AND GYNECOLOGY | Facility: CLINIC | Age: 41
End: 2021-12-08

## 2021-12-08 VITALS
HEIGHT: 67 IN | BODY MASS INDEX: 22.44 KG/M2 | WEIGHT: 143 LBS | DIASTOLIC BLOOD PRESSURE: 84 MMHG | HEART RATE: 79 BPM | SYSTOLIC BLOOD PRESSURE: 129 MMHG

## 2021-12-08 DIAGNOSIS — Z30.42 DEPO-PROVERA CONTRACEPTIVE STATUS: Primary | ICD-10-CM

## 2021-12-08 RX ORDER — MEDROXYPROGESTERONE ACETATE 150 MG/ML
150 INJECTION, SUSPENSION INTRAMUSCULAR ONCE
Status: COMPLETED | OUTPATIENT
Start: 2021-12-08 | End: 2021-12-08

## 2021-12-08 RX ORDER — AZITHROMYCIN 250 MG/1
TABLET, FILM COATED ORAL
COMMUNITY
Start: 2021-11-10 | End: 2022-03-02

## 2021-12-08 RX ORDER — METHYLPREDNISOLONE 4 MG/1
TABLET ORAL
COMMUNITY
End: 2022-03-02

## 2021-12-08 RX ORDER — AMOXICILLIN AND CLAVULANATE POTASSIUM 875; 125 MG/1; MG/1
TABLET, FILM COATED ORAL
COMMUNITY
Start: 2021-12-06 | End: 2022-03-02

## 2021-12-08 RX ORDER — FLUTICASONE PROPIONATE 50 MCG
1 SPRAY, SUSPENSION (ML) NASAL DAILY
COMMUNITY
Start: 2021-12-02

## 2021-12-08 RX ORDER — METHOCARBAMOL 500 MG/1
500 TABLET, FILM COATED ORAL 3 TIMES DAILY PRN
COMMUNITY
Start: 2021-10-16 | End: 2022-05-25

## 2021-12-08 RX ORDER — MEDROXYPROGESTERONE ACETATE 150 MG/ML
INJECTION, SUSPENSION INTRAMUSCULAR
COMMUNITY
Start: 2021-12-03 | End: 2022-01-05 | Stop reason: SDUPTHER

## 2021-12-08 RX ORDER — DEXTROMETHORPHAN POLISTIREX 30 MG/5ML
SUSPENSION ORAL
COMMUNITY
Start: 2021-11-10 | End: 2022-05-25

## 2021-12-08 RX ADMIN — MEDROXYPROGESTERONE ACETATE 150 MG: 150 INJECTION, SUSPENSION INTRAMUSCULAR at 14:36

## 2021-12-08 NOTE — PROGRESS NOTES
Subjective   Da'shame T Love is a 41 y.o. female. Here for patient supplied depo provera inj.Lot # [id number] ndc [id number]. Pt did not have a reaction.    History of Present Illness      Review of Systems    Objective   Physical Exam    Assessment/Plan   There are no diagnoses linked to this encounter.

## 2022-01-05 ENCOUNTER — TELEPHONE (OUTPATIENT)
Dept: OBSTETRICS AND GYNECOLOGY | Facility: CLINIC | Age: 42
End: 2022-01-05

## 2022-01-05 RX ORDER — MEDROXYPROGESTERONE ACETATE 150 MG/ML
150 INJECTION, SUSPENSION INTRAMUSCULAR
Qty: 0.9 ML | Refills: 0 | Status: SHIPPED | OUTPATIENT
Start: 2022-01-05 | End: 2022-03-24 | Stop reason: SDUPTHER

## 2022-03-02 ENCOUNTER — CLINICAL SUPPORT (OUTPATIENT)
Dept: OBSTETRICS AND GYNECOLOGY | Facility: CLINIC | Age: 42
End: 2022-03-02

## 2022-03-02 VITALS
WEIGHT: 152 LBS | SYSTOLIC BLOOD PRESSURE: 145 MMHG | HEART RATE: 96 BPM | HEIGHT: 67 IN | DIASTOLIC BLOOD PRESSURE: 99 MMHG | BODY MASS INDEX: 23.86 KG/M2

## 2022-03-02 DIAGNOSIS — Z30.42 DEPO-PROVERA CONTRACEPTIVE STATUS: Primary | ICD-10-CM

## 2022-03-02 PROCEDURE — 96372 THER/PROPH/DIAG INJ SC/IM: CPT | Performed by: OBSTETRICS & GYNECOLOGY

## 2022-03-02 RX ORDER — MEDROXYPROGESTERONE ACETATE 150 MG/ML
150 INJECTION, SUSPENSION INTRAMUSCULAR ONCE
Status: DISCONTINUED | OUTPATIENT
Start: 2022-03-02 | End: 2022-03-02

## 2022-03-02 RX ORDER — ATORVASTATIN CALCIUM 10 MG/1
TABLET, FILM COATED ORAL
COMMUNITY
End: 2022-05-25

## 2022-03-02 RX ORDER — METHOCARBAMOL 750 MG/1
750 TABLET, FILM COATED ORAL 3 TIMES DAILY PRN
COMMUNITY
Start: 2021-12-15 | End: 2022-03-02

## 2022-03-02 RX ORDER — LEVOTHYROXINE SODIUM 88 UG/1
TABLET ORAL
COMMUNITY
End: 2022-03-02

## 2022-03-02 RX ORDER — CETIRIZINE HYDROCHLORIDE 10 MG/1
TABLET ORAL
COMMUNITY
End: 2022-05-25

## 2022-03-02 RX ORDER — MEDROXYPROGESTERONE ACETATE 150 MG/ML
150 INJECTION, SUSPENSION INTRAMUSCULAR ONCE
Status: COMPLETED | OUTPATIENT
Start: 2022-03-02 | End: 2022-03-02

## 2022-03-02 RX ADMIN — MEDROXYPROGESTERONE ACETATE 150 MG: 150 INJECTION, SUSPENSION INTRAMUSCULAR at 14:04

## 2022-03-10 RX ORDER — FAMOTIDINE 40 MG/1
40 TABLET, FILM COATED ORAL DAILY
Qty: 90 TABLET | Refills: 1 | Status: SHIPPED | OUTPATIENT
Start: 2022-03-10 | End: 2022-03-15

## 2022-03-15 ENCOUNTER — OFFICE VISIT (OUTPATIENT)
Dept: GASTROENTEROLOGY | Facility: CLINIC | Age: 42
End: 2022-03-15

## 2022-03-15 VITALS — TEMPERATURE: 97.1 F | BODY MASS INDEX: 22.34 KG/M2 | HEIGHT: 66 IN | WEIGHT: 139 LBS

## 2022-03-15 DIAGNOSIS — R14.0 ABDOMINAL BLOATING: ICD-10-CM

## 2022-03-15 DIAGNOSIS — Z80.0 FH: COLON CANCER: ICD-10-CM

## 2022-03-15 DIAGNOSIS — K21.9 GASTROESOPHAGEAL REFLUX DISEASE WITHOUT ESOPHAGITIS: ICD-10-CM

## 2022-03-15 DIAGNOSIS — K58.1 IRRITABLE BOWEL SYNDROME WITH CONSTIPATION: Primary | ICD-10-CM

## 2022-03-15 PROCEDURE — 99214 OFFICE O/P EST MOD 30 MIN: CPT | Performed by: NURSE PRACTITIONER

## 2022-03-15 RX ORDER — OMEPRAZOLE 40 MG/1
40 CAPSULE, DELAYED RELEASE ORAL DAILY
Qty: 30 CAPSULE | Refills: 5 | Status: SHIPPED | OUTPATIENT
Start: 2022-03-15 | End: 2022-05-25

## 2022-03-15 NOTE — PROGRESS NOTES
Chief Complaint   Patient presents with   • Irritable Bowel Syndrome   • Abdominal Pain   • Bloated   • Constipation   • Diarrhea       Brooklynn Perez is a  41 y.o. female here for a follow up visit for IBS.    HPI  41-year-old female presents today for a follow-up visit for IBS-C.  She is a patient of Dr. Jolly.  She was last seen in the office by CHRISTOPHER Brooke on 9/22/2021.  She has a history of IBS-C and admits she does really well on MiraLAX daily.  Sometimes she will increase it and take it twice a day if she gets backed up but most the time she does really well on once a day MiraLAX.  She also has a history of GERD and admits lately she has been unwell on omeprazole 20 mg daily and Pepcid 40 mg daily but she just found out that she is having more more allergic reactions to antihistamines and therefore wants to stop taking the Pepcid.  She denies any dysphagia, reflux, nausea and vomiting, diarrhea, rectal bleeding or melena.  She admits her appetite is good and her weight has dropped 14 pounds since December of last year.  She does have history of appendectomy and thyroidectomy.  Her last EGD and colonoscopy was on 8/2017.  She does have a family history of colon cancer.  Next screening colonoscopy will be due this fall.  She has a family history of colon cancer.  Past Medical History:   Diagnosis Date   • Asthma    • Bipolar disorder (HCC)    • GERD (gastroesophageal reflux disease)    • Graves disease    • Hyperlipidemia    • Hypertension    • IBS (irritable bowel syndrome)        Past Surgical History:   Procedure Laterality Date   • APPENDECTOMY     • COLONOSCOPY  2017   • THYROIDECTOMY  08/2019   • TUBAL ABDOMINAL LIGATION         Scheduled Meds:    Continuous Infusions:No current facility-administered medications for this visit.      PRN Meds:.    Allergies   Allergen Reactions   • Diphenhydramine Hives and Anaphylaxis   • Fexofenadine-Pseudoephed Er Anaphylaxis   • Fish Oil Anaphylaxis   •  Mechlorethamine Anaphylaxis   • Hydrocodone Hives   • Allyl Isothiocyanate Hives   • Antihistamines, Chlorpheniramine-Type Itching   • Antihistamines, Diphenhydramine-Type Itching and Rash     Benadryl cream  Benadryl cream  Benadryl cream   • Grape (Artificial) Flavor Hives   • Latex Rash and Hives       Social History     Socioeconomic History   • Marital status: Unknown   Tobacco Use   • Smoking status: Former Smoker     Types: Cigars   • Smokeless tobacco: Never Used   Substance and Sexual Activity   • Alcohol use: Yes     Comment: rarely   • Drug use: No   • Sexual activity: Not Currently     Partners: Male     Birth control/protection: Tubal ligation       Family History   Problem Relation Age of Onset   • Colon cancer Maternal Aunt    • Colon cancer Maternal Uncle    • Breast cancer Neg Hx    • Ovarian cancer Neg Hx    • Uterine cancer Neg Hx    • Deep vein thrombosis Neg Hx    • Pulmonary embolism Neg Hx        Review of Systems   Constitutional: Negative for appetite change, chills, diaphoresis, fatigue, fever and unexpected weight change.   HENT: Negative for nosebleeds, postnasal drip, sore throat, trouble swallowing and voice change.    Respiratory: Negative for cough, choking, chest tightness, shortness of breath, wheezing and stridor.    Cardiovascular: Negative for chest pain, palpitations and leg swelling.   Gastrointestinal: Negative for abdominal distention, abdominal pain, anal bleeding, blood in stool, constipation, diarrhea, nausea, rectal pain and vomiting.   Endocrine: Negative for polydipsia, polyphagia and polyuria.   Musculoskeletal: Negative for gait problem.   Skin: Negative for rash and wound.   Allergic/Immunologic: Negative for food allergies.   Neurological: Negative for dizziness, speech difficulty and light-headedness.   Psychiatric/Behavioral: Negative for confusion, self-injury, sleep disturbance and suicidal ideas.       Vitals:    03/15/22 1308   Temp: 97.1 °F (36.2 °C)        Physical Exam  Constitutional:       General: She is not in acute distress.     Appearance: She is well-developed. She is not ill-appearing.   HENT:      Head: Normocephalic.   Eyes:      Pupils: Pupils are equal, round, and reactive to light.   Cardiovascular:      Rate and Rhythm: Normal rate and regular rhythm.      Heart sounds: Normal heart sounds.   Pulmonary:      Effort: Pulmonary effort is normal.      Breath sounds: Normal breath sounds.   Abdominal:      General: Bowel sounds are normal. There is distension.      Palpations: Abdomen is soft. There is no mass.      Tenderness: There is no abdominal tenderness. There is no guarding or rebound.      Hernia: No hernia is present.   Musculoskeletal:         General: Normal range of motion.   Skin:     General: Skin is warm and dry.   Neurological:      Mental Status: She is alert and oriented to person, place, and time.   Psychiatric:         Speech: Speech normal.         Behavior: Behavior normal.         Judgment: Judgment normal.         No radiology results for the last 7 days     Diagnoses and all orders for this visit:    1. Irritable bowel syndrome with constipation (Primary)    2. Gastroesophageal reflux disease without esophagitis    3. Abdominal bloating    4. FH: colon cancer    Other orders  -     omeprazole (priLOSEC) 40 MG capsule; Take 1 capsule by mouth Daily.  Dispense: 30 capsule; Refill: 5    GERD seems well controlled on omeprazole 20 mg daily and Pepcid.  However with her wanting to stop the Pepcid we probably should increase the omeprazole to 40 mg daily and see how she does.  Bowels seem to be moving well as long she takes her MiraLAX.  Overall she seems to be doing quite well GI wise.  She will be due for her screening colonoscopy later this year.  Patient to call the office with any issues.  Patient to follow-up with me in 3 months.  Patient is agreeable to the plan.

## 2022-03-24 ENCOUNTER — TELEPHONE (OUTPATIENT)
Dept: GASTROENTEROLOGY | Facility: CLINIC | Age: 42
End: 2022-03-24

## 2022-03-24 ENCOUNTER — TELEPHONE (OUTPATIENT)
Dept: OBSTETRICS AND GYNECOLOGY | Facility: CLINIC | Age: 42
End: 2022-03-24

## 2022-03-24 RX ORDER — MEDROXYPROGESTERONE ACETATE 150 MG/ML
150 INJECTION, SUSPENSION INTRAMUSCULAR
Qty: 0.9 ML | Refills: 0 | Status: SHIPPED | OUTPATIENT
Start: 2022-03-24 | End: 2022-04-26 | Stop reason: SDUPTHER

## 2022-03-24 NOTE — TELEPHONE ENCOUNTER
Pt called to request her depo be sent to new pharmacy.  [facility] is often out of her medications and she is not happy with them.  Please send remaining refills to [facility]-updated in Epic.    Pt # [phone]

## 2022-03-24 NOTE — TELEPHONE ENCOUNTER
----- Message from Nisreen Varner sent at 3/24/2022  9:36 AM EDT -----  Regarding: CALL BACK  Contact: [phone]  PT changed pharmacy and needs to talk a nurse regarding her prescriptions. Please call : [phone]

## 2022-03-24 NOTE — TELEPHONE ENCOUNTER
Call to pt.  States wants to change to to [facility] for any future refills.  [facility].     Did so.

## 2022-04-26 ENCOUNTER — TELEPHONE (OUTPATIENT)
Dept: OBSTETRICS AND GYNECOLOGY | Facility: CLINIC | Age: 42
End: 2022-04-26

## 2022-04-26 RX ORDER — MEDROXYPROGESTERONE ACETATE 150 MG/ML
150 INJECTION, SUSPENSION INTRAMUSCULAR
Qty: 0.9 ML | Refills: 0 | Status: SHIPPED | OUTPATIENT
Start: 2022-04-26 | End: 2022-05-25 | Stop reason: SDUPTHER

## 2022-04-26 NOTE — TELEPHONE ENCOUNTER
Pt called to report she has misplaced her depo injection.  She is requesting a replacement injection be sent to her pharmacy.  Pt aware the cost may be out of pocket, if her insurance does not allow a replacement.     Pt # [phone]

## 2022-04-26 NOTE — TELEPHONE ENCOUNTER
Pt informed.  I actually change her appt from depo to annual & depo at same time (1:40 on 5/25/22), pt declined that because she has another appt later that day.  Encouraged pt to call back as soon as possible to schedule annual exam, before 5/25/22 if possible.

## 2022-04-26 NOTE — TELEPHONE ENCOUNTER
Shahnaz,     Rx sent   Does need to schedule annual exam (overdue since March).     Thanks,   Dr. Vale

## 2022-05-25 ENCOUNTER — OFFICE VISIT (OUTPATIENT)
Dept: OBSTETRICS AND GYNECOLOGY | Facility: CLINIC | Age: 42
End: 2022-05-25

## 2022-05-25 VITALS
WEIGHT: 145 LBS | HEIGHT: 66 IN | DIASTOLIC BLOOD PRESSURE: 104 MMHG | HEART RATE: 68 BPM | SYSTOLIC BLOOD PRESSURE: 149 MMHG | BODY MASS INDEX: 23.3 KG/M2

## 2022-05-25 DIAGNOSIS — Z01.419 ENCOUNTER FOR GYNECOLOGICAL EXAMINATION WITHOUT ABNORMAL FINDING: Primary | ICD-10-CM

## 2022-05-25 DIAGNOSIS — Z30.42 ENCOUNTER FOR SURVEILLANCE OF INJECTABLE CONTRACEPTIVE: ICD-10-CM

## 2022-05-25 PROCEDURE — 99395 PREV VISIT EST AGE 18-39: CPT | Performed by: OBSTETRICS & GYNECOLOGY

## 2022-05-25 PROCEDURE — 96372 THER/PROPH/DIAG INJ SC/IM: CPT | Performed by: OBSTETRICS & GYNECOLOGY

## 2022-05-25 PROCEDURE — 3008F BODY MASS INDEX DOCD: CPT | Performed by: OBSTETRICS & GYNECOLOGY

## 2022-05-25 PROCEDURE — 2014F MENTAL STATUS ASSESS: CPT | Performed by: OBSTETRICS & GYNECOLOGY

## 2022-05-25 RX ORDER — MEDROXYPROGESTERONE ACETATE 150 MG/ML
150 INJECTION, SUSPENSION INTRAMUSCULAR
Qty: 0.9 ML | Refills: 3 | Status: SHIPPED | OUTPATIENT
Start: 2022-05-25

## 2022-05-25 RX ORDER — BUDESONIDE AND FORMOTEROL FUMARATE DIHYDRATE 160; 4.5 UG/1; UG/1
AEROSOL RESPIRATORY (INHALATION)
COMMUNITY
Start: 2022-05-16

## 2022-05-25 RX ORDER — MEDROXYPROGESTERONE ACETATE 150 MG/ML
150 INJECTION, SUSPENSION INTRAMUSCULAR ONCE
Status: COMPLETED | OUTPATIENT
Start: 2022-05-25 | End: 2022-05-25

## 2022-05-25 RX ORDER — ALBUTEROL SULFATE 2.5 MG/3ML
SOLUTION RESPIRATORY (INHALATION)
COMMUNITY
Start: 2022-04-14

## 2022-05-25 RX ORDER — HYDROCODONE BITARTRATE AND ACETAMINOPHEN 7.5; 325 MG/1; MG/1
TABLET ORAL
COMMUNITY
Start: 2022-05-05 | End: 2022-09-07

## 2022-05-25 RX ORDER — MULTIVITAMIN
TABLET ORAL
COMMUNITY
Start: 2022-05-16

## 2022-05-25 RX ORDER — DICLOFENAC SODIUM 75 MG/1
75 TABLET, DELAYED RELEASE ORAL
COMMUNITY
Start: 2021-12-13 | End: 2022-09-07

## 2022-05-25 RX ORDER — OMEPRAZOLE 20 MG/1
CAPSULE, DELAYED RELEASE ORAL
COMMUNITY
Start: 2022-03-30 | End: 2022-07-06 | Stop reason: SDUPTHER

## 2022-05-25 RX ORDER — LEVOTHYROXINE SODIUM 100 MCG
TABLET ORAL
COMMUNITY
Start: 2022-05-20 | End: 2022-09-07

## 2022-05-25 RX ORDER — DEXTROMETHORPHAN HYDROBROMIDE AND PROMETHAZINE HYDROCHLORIDE 15; 6.25 MG/5ML; MG/5ML
SYRUP ORAL
COMMUNITY
Start: 2022-05-19 | End: 2022-11-21

## 2022-05-25 RX ORDER — CETIRIZINE HYDROCHLORIDE 10 MG/1
10 TABLET ORAL DAILY
COMMUNITY
Start: 2022-03-10

## 2022-05-25 RX ORDER — DICYCLOMINE HYDROCHLORIDE 10 MG/1
CAPSULE ORAL
COMMUNITY
Start: 2022-04-29 | End: 2022-07-06

## 2022-05-25 RX ORDER — FAMOTIDINE 40 MG/1
TABLET, FILM COATED ORAL
COMMUNITY
Start: 2022-03-21 | End: 2022-07-06

## 2022-05-25 RX ORDER — LIDOCAINE 50 MG/G
PATCH TOPICAL
COMMUNITY
Start: 2022-05-19

## 2022-05-25 RX ORDER — ATORVASTATIN CALCIUM 10 MG/1
10 TABLET, FILM COATED ORAL DAILY
COMMUNITY
Start: 2022-03-11 | End: 2022-09-07

## 2022-05-25 RX ORDER — METHOCARBAMOL 750 MG/1
TABLET, FILM COATED ORAL
COMMUNITY
Start: 2022-04-14

## 2022-05-25 RX ADMIN — MEDROXYPROGESTERONE ACETATE 150 MG: 150 INJECTION, SUSPENSION INTRAMUSCULAR at 15:03

## 2022-05-25 NOTE — PROGRESS NOTES
GYN Annual Exam     CC- Here for annual exam.     Brooklynn Perez is a 41 y.o. female who presents for annual well woman exam. Periods are absent due to Depo.   Depo given, no reaction noted.     OB History        2    Para   2    Term                AB        Living   2       SAB        IAB        Ectopic        Molar        Multiple        Live Births                    Current contraception: Depo-Provera injections  History of abnormal Pap smear: yes - no treatment required  Family history of uterine, colon or ovarian cancer: yes - colon- aunt and uncle   History of abnormal mammogram: no  Family history of breast cancer: no  Last Pap : 2021 NIL HPV neg     Past Medical History:   Diagnosis Date   • Asthma    • Bipolar disorder (HCC)    • GERD (gastroesophageal reflux disease)    • Graves disease    • Hyperlipidemia    • Hypertension    • IBS (irritable bowel syndrome)        Past Surgical History:   Procedure Laterality Date   • APPENDECTOMY     • COLONOSCOPY     • THYROIDECTOMY  2019   • TUBAL ABDOMINAL LIGATION           Current Outpatient Medications:   •  atorvastatin (LIPITOR) 10 MG tablet, Take 10 mg by mouth Daily., Disp: , Rfl:   •  cetirizine (zyrTEC) 10 MG tablet, Take 10 mg by mouth Daily., Disp: , Rfl:   •  diclofenac (VOLTAREN) 75 MG EC tablet, Take 75 mg by mouth., Disp: , Rfl:   •  medroxyPROGESTERone Acetate 150 MG/ML suspension prefilled syringe, Inject 1 mL into the appropriate muscle as directed by prescriber Every 3 (Three) Months., Disp: 0.9 mL, Rfl: 3  •  methocarbamol (ROBAXIN) 750 MG tablet, methocarbamol 750 mg tablet  Take 1 tablet 3 times a day by oral route as needed for 30 days., Disp: , Rfl:   •  albuterol (PROVENTIL) (2.5 MG/3ML) 0.083% nebulizer solution, , Disp: , Rfl:   •  ALLERGY SERUM INJECTION, Inject  under the skin into the appropriate area as directed 1 (One) Time., Disp: , Rfl:   •  amLODIPine (NORVASC) 10 MG tablet, Take 10 mg by mouth Daily.,  Disp: , Rfl:   •  azelastine (ASTELIN) 0.1 % nasal spray, 2 sprays into the nostril(s) as directed by provider Daily., Disp: , Rfl:   •  buPROPion SR (WELLBUTRIN SR) 150 MG 12 hr tablet, Wellbutrin  mg tablet, 12 hr sustained-release  Take 1 tablet every day by oral route., Disp: , Rfl:   •  chlorthalidone (HYGROTON) 25 MG tablet, Take 1 tablet by mouth Daily., Disp: 30 tablet, Rfl: 1  •  Cholecalciferol (VITAMIN D) 50 MCG (2000 UT) tablet, Take 2,000 Units by mouth Daily., Disp: , Rfl:   •  dicyclomine (BENTYL) 10 MG capsule, , Disp: , Rfl:   •  DULoxetine (CYMBALTA) 20 MG capsule, Take 20 mg by mouth 2 (Two) Times a Day., Disp: , Rfl:   •  EPINEPHrine (EPIPEN) 0.3 MG/0.3ML solution auto-injector injection, epinephrine 0.3 mg/0.3 mL injection, auto-injector, Disp: , Rfl:   •  famotidine (PEPCID) 40 MG tablet, , Disp: , Rfl:   •  FeroSul 325 (65 Fe) MG tablet, Take 1 tablet by mouth Daily., Disp: , Rfl:   •  fluticasone (FLONASE) 50 MCG/ACT nasal spray, 1 spray by Each Nare route Daily. Shake before using., Disp: , Rfl:   •  HYDROcodone-acetaminophen (NORCO) 7.5-325 MG per tablet, , Disp: , Rfl:   •  ipratropium-albuterol (DUO-NEB) 0.5-2.5 mg/3 ml nebulizer, Inhale 3 mL 4 (Four) Times a Day., Disp: , Rfl:   •  lidocaine (LIDODERM) 5 %, , Disp: , Rfl:   •  lisinopril (PRINIVIL,ZESTRIL) 40 MG tablet, lisinopril 40 mg tablet  Take 1 tablet every day by oral route as directed for 90 days., Disp: , Rfl:   •  metoprolol succinate XL (TOPROL-XL) 50 MG 24 hr tablet, Take 50 mg by mouth Daily., Disp: , Rfl:   •  montelukast (SINGULAIR) 10 MG tablet, Take 10 mg by mouth Daily., Disp: , Rfl:   •  Multivitamin tablet tablet, , Disp: , Rfl:   •  omeprazole (priLOSEC) 20 MG capsule, , Disp: , Rfl:   •  polyethylene glycol (MIRALAX) 17 g packet, Take 17 g by mouth Daily., Disp: 30 packet, Rfl: 11  •  potassium chloride 10 MEQ CR tablet, Take 10 mEq by mouth Daily., Disp: , Rfl:   •  promethazine (PHENERGAN) 25 MG tablet,  "Take 25 mg by mouth., Disp: , Rfl:   •  promethazine-dextromethorphan (PROMETHAZINE-DM) 6.25-15 MG/5ML syrup, , Disp: , Rfl:   •  propranolol (INDERAL) 20 MG tablet, propranolol 20 mg tablet  Take 1 tablet every day by oral route as directed for 22 days., Disp: , Rfl:   •  sodium chloride 0.65 % nasal spray, Deep Sea Nasal 0.65 % spray aerosol, Disp: , Rfl:   •  Symbicort 160-4.5 MCG/ACT inhaler, , Disp: , Rfl:   •  Synthroid 100 MCG tablet, , Disp: , Rfl:   •  Tiotropium Bromide Monohydrate (Spiriva Respimat) 1.25 MCG/ACT aerosol solution inhaler, Spiriva Respimat 1.25 mcg/actuation solution for inhalation, Disp: , Rfl:     Allergies   Allergen Reactions   • Diphenhydramine Hives and Anaphylaxis   • Fexofenadine-Pseudoephed Er Anaphylaxis   • Fish Oil Anaphylaxis   • Mechlorethamine Anaphylaxis   • Hydrocodone Hives   • Allyl Isothiocyanate Hives   • Antihistamines, Chlorpheniramine-Type Itching   • Antihistamines, Diphenhydramine-Type Itching and Rash     Benadryl cream  Benadryl cream  Benadryl cream   • Grape (Artificial) Flavor Hives   • Iodine Rash   • Latex Rash and Hives       Social History     Tobacco Use   • Smoking status: Former Smoker     Types: Cigars   • Smokeless tobacco: Never Used   Substance Use Topics   • Alcohol use: Yes     Comment: rarely   • Drug use: No       Family History   Problem Relation Age of Onset   • Colon cancer Maternal Aunt    • Colon cancer Maternal Uncle    • Breast cancer Neg Hx    • Ovarian cancer Neg Hx    • Uterine cancer Neg Hx    • Deep vein thrombosis Neg Hx    • Pulmonary embolism Neg Hx        Review of Systems   Constitutional: Negative for chills and fever.   Gastrointestinal: Negative for abdominal pain, constipation and diarrhea.   Genitourinary: Negative for menstrual problem, pelvic pain, vaginal bleeding and vaginal discharge.   All other systems reviewed and are negative.      BP (!) 149/104   Pulse 68   Ht 167.6 cm (66\")   Wt 65.8 kg (145 lb)   " Breastfeeding No   BMI 23.40 kg/m²     Physical Exam  Constitutional:       General: She is not in acute distress.     Appearance: She is well-developed and normal weight.   Genitourinary:      Vulva normal.      Right Labia: No lesions or Bartholin's cyst.     Left Labia: No lesions or Bartholin's cyst.     No inguinal adenopathy present in the right or left side.     Vaginal bleeding present.      No vaginal discharge.        Right Adnexa: not tender, not full and no mass present.     Left Adnexa: not tender, not full and no mass present.     No cervical motion tenderness or friability.      Uterus is not enlarged or tender.      No uterine mass detected.     Uterus is anteverted.   Breasts:      Right: No inverted nipple, mass or nipple discharge.      Left: No inverted nipple, mass or nipple discharge.       HENT:      Head: Normocephalic and atraumatic.      Nose: Nose normal.   Eyes:      Conjunctiva/sclera: Conjunctivae normal.      Pupils: Pupils are equal, round, and reactive to light.   Neck:      Thyroid: No thyromegaly.   Cardiovascular:      Rate and Rhythm: Normal rate and regular rhythm.      Heart sounds: Normal heart sounds. No murmur heard.  Pulmonary:      Effort: Pulmonary effort is normal. No respiratory distress.      Breath sounds: Normal breath sounds.   Abdominal:      General: Abdomen is flat. There is no distension.      Palpations: Abdomen is soft.      Tenderness: There is no abdominal tenderness.   Musculoskeletal:         General: No deformity. Normal range of motion.      Cervical back: Normal range of motion and neck supple.      Right lower leg: No edema.      Left lower leg: No edema.   Lymphadenopathy:      Lower Body: No right inguinal adenopathy. No left inguinal adenopathy.   Neurological:      Mental Status: She is alert and oriented to person, place, and time.   Skin:     General: Skin is warm and dry.      Findings: No erythema.   Psychiatric:         Behavior: Behavior  normal.         Thought Content: Thought content normal.         Judgment: Judgment normal.   Vitals reviewed. Exam conducted with a chaperone present.               Assessment     Diagnoses and all orders for this visit:    1. Encounter for gynecological examination without abnormal finding (Primary)    2. Encounter for surveillance of injectable contraceptive    Other orders  -     medroxyPROGESTERone Acetate 150 MG/ML suspension prefilled syringe; Inject 1 mL into the appropriate muscle as directed by prescriber Every 3 (Three) Months.  Dispense: 0.9 mL; Refill: 3    1) GYN exam   Expectation reviewed.   - MMG start next year  - Pap up to date  - Check on BP   2) Using DMPA for menstrual regulation   Rx sent      Plan     1) Breast Health - Clinical breast exam yearly, Discussed American cancer society recommendations for breast cancer screening, and Self breast awareness monthly  2) Pap - up to date   3) Smoking status- cessation encouarged   4) Encouraged to be wary of information obtained via social media and internet based on source and search.  5) Follow up prn and one year.       Александр Vale MD   5/25/2022  14:12 EDT

## 2022-07-06 ENCOUNTER — OFFICE VISIT (OUTPATIENT)
Dept: GASTROENTEROLOGY | Facility: CLINIC | Age: 42
End: 2022-07-06

## 2022-07-06 VITALS
SYSTOLIC BLOOD PRESSURE: 133 MMHG | TEMPERATURE: 97.3 F | HEIGHT: 66 IN | BODY MASS INDEX: 21.95 KG/M2 | WEIGHT: 136.6 LBS | DIASTOLIC BLOOD PRESSURE: 89 MMHG | HEART RATE: 76 BPM

## 2022-07-06 DIAGNOSIS — K58.1 IRRITABLE BOWEL SYNDROME WITH CONSTIPATION: Primary | ICD-10-CM

## 2022-07-06 DIAGNOSIS — K21.9 GASTROESOPHAGEAL REFLUX DISEASE WITHOUT ESOPHAGITIS: ICD-10-CM

## 2022-07-06 DIAGNOSIS — Z80.0 FH: COLON CANCER: ICD-10-CM

## 2022-07-06 PROCEDURE — 99214 OFFICE O/P EST MOD 30 MIN: CPT | Performed by: INTERNAL MEDICINE

## 2022-07-06 RX ORDER — OMEPRAZOLE 20 MG/1
20 CAPSULE, DELAYED RELEASE ORAL DAILY
Qty: 30 CAPSULE | Refills: 3 | Status: SHIPPED | OUTPATIENT
Start: 2022-07-06

## 2022-07-06 RX ORDER — POLYETHYLENE GLYCOL 3350 17 G/17G
17 POWDER, FOR SOLUTION ORAL DAILY
Qty: 30 PACKET | Refills: 11 | Status: SHIPPED | OUTPATIENT
Start: 2022-07-06

## 2022-07-06 NOTE — PROGRESS NOTES
Subjective   Chief Complaint   Patient presents with   • Irritable Bowel Syndrome   • Constipation   • Hemorrhoids   • Nausea   • Abdominal Pain       Brooklynn Perez is a  41 y.o. female here for a follow up visit for constipation.     She reports that her constipation is been poorly controlled she has been out of MiraLAX for some time.  She was doing much better when she was taking this regularly.  She typically takes this once a day and sometimes twice a day but she ran out and has not had time to call back because she has been dealing with other health issues.  She sometimes has heartburn in the morning and she is taking omeprazole for this.  She is having difficulty figuring out the timing of this medicine with her thyroid medication.  She is not really having nighttime issues but she is taking an evening dose as well.  She feels like the 40 mg dose is too much and she wants to return to the 20 mg dose    Her last EGD and colonoscopy was on 8/2017.  She does have a family history of colon cancer.  Next screening colonoscopy will be due this fall.    HPI  Past Medical History:   Diagnosis Date   • Asthma    • Bipolar disorder (HCC)    • GERD (gastroesophageal reflux disease)    • Graves disease    • Hyperlipidemia    • Hypertension    • IBS (irritable bowel syndrome)      Past Surgical History:   Procedure Laterality Date   • APPENDECTOMY     • COLONOSCOPY  2017   • THYROIDECTOMY  08/2019   • TUBAL ABDOMINAL LIGATION         Current Outpatient Medications:   •  albuterol (PROVENTIL) (2.5 MG/3ML) 0.083% nebulizer solution, , Disp: , Rfl:   •  ALLERGY SERUM INJECTION, Inject  under the skin into the appropriate area as directed 1 (One) Time., Disp: , Rfl:   •  amLODIPine (NORVASC) 10 MG tablet, Take 10 mg by mouth Daily., Disp: , Rfl:   •  atorvastatin (LIPITOR) 10 MG tablet, Take 10 mg by mouth Daily., Disp: , Rfl:   •  azelastine (ASTELIN) 0.1 % nasal spray, 2 sprays into the nostril(s) as directed by provider  Daily., Disp: , Rfl:   •  buPROPion SR (WELLBUTRIN SR) 150 MG 12 hr tablet, Wellbutrin  mg tablet, 12 hr sustained-release  Take 1 tablet every day by oral route., Disp: , Rfl:   •  cetirizine (zyrTEC) 10 MG tablet, Take 10 mg by mouth Daily., Disp: , Rfl:   •  chlorthalidone (HYGROTON) 25 MG tablet, Take 1 tablet by mouth Daily., Disp: 30 tablet, Rfl: 1  •  Cholecalciferol (VITAMIN D) 50 MCG (2000 UT) tablet, Take 2,000 Units by mouth Daily., Disp: , Rfl:   •  diclofenac (VOLTAREN) 75 MG EC tablet, Take 75 mg by mouth., Disp: , Rfl:   •  DULoxetine (CYMBALTA) 20 MG capsule, Take 20 mg by mouth 2 (Two) Times a Day., Disp: , Rfl:   •  EPINEPHrine (EPIPEN) 0.3 MG/0.3ML solution auto-injector injection, epinephrine 0.3 mg/0.3 mL injection, auto-injector, Disp: , Rfl:   •  FeroSul 325 (65 Fe) MG tablet, Take 1 tablet by mouth Daily., Disp: , Rfl:   •  fluticasone (FLONASE) 50 MCG/ACT nasal spray, 1 spray by Each Nare route Daily. Shake before using., Disp: , Rfl:   •  HYDROcodone-acetaminophen (NORCO) 7.5-325 MG per tablet, , Disp: , Rfl:   •  ipratropium-albuterol (DUO-NEB) 0.5-2.5 mg/3 ml nebulizer, Inhale 3 mL 4 (Four) Times a Day., Disp: , Rfl:   •  lidocaine (LIDODERM) 5 %, , Disp: , Rfl:   •  lisinopril (PRINIVIL,ZESTRIL) 40 MG tablet, lisinopril 40 mg tablet  Take 1 tablet every day by oral route as directed for 90 days., Disp: , Rfl:   •  medroxyPROGESTERone Acetate 150 MG/ML suspension prefilled syringe, Inject 1 mL into the appropriate muscle as directed by prescriber Every 3 (Three) Months., Disp: 0.9 mL, Rfl: 3  •  methocarbamol (ROBAXIN) 750 MG tablet, methocarbamol 750 mg tablet  Take 1 tablet 3 times a day by oral route as needed for 30 days., Disp: , Rfl:   •  metoprolol succinate XL (TOPROL-XL) 50 MG 24 hr tablet, Take 50 mg by mouth Daily., Disp: , Rfl:   •  montelukast (SINGULAIR) 10 MG tablet, Take 10 mg by mouth Daily., Disp: , Rfl:   •  Multivitamin tablet tablet, , Disp: , Rfl:   •   omeprazole (priLOSEC) 20 MG capsule, Take 1 capsule by mouth Daily., Disp: 30 capsule, Rfl: 3  •  polyethylene glycol (MIRALAX) 17 g packet, Take 17 g by mouth Daily., Disp: 30 packet, Rfl: 11  •  potassium chloride 10 MEQ CR tablet, Take 10 mEq by mouth Daily., Disp: , Rfl:   •  promethazine (PHENERGAN) 25 MG tablet, Take 25 mg by mouth., Disp: , Rfl:   •  promethazine-dextromethorphan (PROMETHAZINE-DM) 6.25-15 MG/5ML syrup, , Disp: , Rfl:   •  propranolol (INDERAL) 20 MG tablet, propranolol 20 mg tablet  Take 1 tablet every day by oral route as directed for 22 days., Disp: , Rfl:   •  sodium chloride 0.65 % nasal spray, Deep Sea Nasal 0.65 % spray aerosol, Disp: , Rfl:   •  Symbicort 160-4.5 MCG/ACT inhaler, , Disp: , Rfl:   •  Synthroid 100 MCG tablet, , Disp: , Rfl:   •  Tiotropium Bromide Monohydrate (Spiriva Respimat) 1.25 MCG/ACT aerosol solution inhaler, Spiriva Respimat 1.25 mcg/actuation solution for inhalation, Disp: , Rfl:   PRN Meds:.  Allergies   Allergen Reactions   • Diphenhydramine Hives and Anaphylaxis   • Fexofenadine-Pseudoephed Er Anaphylaxis   • Fish Oil Anaphylaxis   • Mechlorethamine Anaphylaxis   • Hydrocodone Hives   • Allyl Isothiocyanate Hives   • Antihistamines, Chlorpheniramine-Type Itching   • Antihistamines, Diphenhydramine-Type Itching and Rash     Benadryl cream  Benadryl cream  Benadryl cream   • Grape (Artificial) Flavor Hives   • Iodine Rash   • Latex Rash and Hives     Social History     Socioeconomic History   • Marital status: Unknown   Tobacco Use   • Smoking status: Former Smoker     Types: Cigars   • Smokeless tobacco: Never Used   Substance and Sexual Activity   • Alcohol use: Yes     Comment: rarely   • Drug use: No   • Sexual activity: Not Currently     Partners: Male     Birth control/protection: Tubal ligation     Family History   Problem Relation Age of Onset   • Colon cancer Maternal Aunt    • Colon cancer Maternal Uncle    • Breast cancer Neg Hx    • Ovarian cancer  Neg Hx    • Uterine cancer Neg Hx    • Deep vein thrombosis Neg Hx    • Pulmonary embolism Neg Hx      Review of Systems   Constitutional: Negative for appetite change and unexpected weight change.   Gastrointestinal: Positive for abdominal pain and constipation. Negative for blood in stool.   Musculoskeletal: Positive for arthralgias.     Vitals:    07/06/22 1518   BP: 133/89   Pulse: 76   Temp: 97.3 °F (36.3 °C)         07/06/22  1518   Weight: 62 kg (136 lb 9.6 oz)       Objective   Physical Exam  Constitutional:       Appearance: Normal appearance. She is well-developed.   HENT:      Head: Normocephalic and atraumatic.   Eyes:      General: No scleral icterus.     Conjunctiva/sclera: Conjunctivae normal.   Pulmonary:      Effort: Pulmonary effort is normal.   Abdominal:      General: There is no distension.      Palpations: Abdomen is soft.   Musculoskeletal:      Cervical back: Normal range of motion and neck supple.   Skin:     General: Skin is warm and dry.   Neurological:      Mental Status: She is alert.   Psychiatric:         Mood and Affect: Mood normal.         Behavior: Behavior normal.       No radiology results for the last 7 days    Assessment & Plan   Diagnoses and all orders for this visit:    Irritable bowel syndrome with constipation    Gastroesophageal reflux disease without esophagitis    FH: colon cancer    Other orders  -     polyethylene glycol (MIRALAX) 17 g packet; Take 17 g by mouth Daily.  -     omeprazole (priLOSEC) 20 MG capsule; Take 1 capsule by mouth Daily.      Plan:  · Resume MiraLAX once a day-sent to pharmacy  · She is not having terribly significant heartburn at this point-I would recommend she start omeprazole again in the morning and DC the evening dose  · She is due for colonoscopy-we will revisit this in the fall when her constipation is better controlled

## 2022-09-07 ENCOUNTER — CLINICAL SUPPORT (OUTPATIENT)
Dept: OBSTETRICS AND GYNECOLOGY | Facility: CLINIC | Age: 42
End: 2022-09-07

## 2022-09-07 VITALS
HEIGHT: 66 IN | BODY MASS INDEX: 22.34 KG/M2 | WEIGHT: 139 LBS | DIASTOLIC BLOOD PRESSURE: 87 MMHG | SYSTOLIC BLOOD PRESSURE: 125 MMHG | HEART RATE: 75 BPM

## 2022-09-07 DIAGNOSIS — Z30.42 DEPO-PROVERA CONTRACEPTIVE STATUS: Primary | ICD-10-CM

## 2022-09-07 LAB
B-HCG UR QL: NEGATIVE
EXPIRATION DATE: NORMAL
INTERNAL NEGATIVE CONTROL: NEGATIVE
INTERNAL POSITIVE CONTROL: POSITIVE
Lab: NORMAL

## 2022-09-07 PROCEDURE — 96372 THER/PROPH/DIAG INJ SC/IM: CPT | Performed by: OBSTETRICS & GYNECOLOGY

## 2022-09-07 PROCEDURE — 81025 URINE PREGNANCY TEST: CPT | Performed by: OBSTETRICS & GYNECOLOGY

## 2022-09-07 RX ORDER — HYDROCODONE BITARTRATE AND ACETAMINOPHEN 5; 325 MG/1; MG/1
TABLET ORAL EVERY 8 HOURS SCHEDULED
COMMUNITY

## 2022-09-07 RX ORDER — DICLOFENAC 16.05 MG/ML
SOLUTION TOPICAL
COMMUNITY

## 2022-09-07 RX ORDER — PROMETHAZINE HYDROCHLORIDE 6.25 MG/5ML
SYRUP ORAL
COMMUNITY
Start: 2022-06-20

## 2022-09-07 RX ORDER — OMEPRAZOLE 40 MG/1
CAPSULE, DELAYED RELEASE ORAL
COMMUNITY
Start: 2022-06-20

## 2022-09-07 RX ORDER — MEDROXYPROGESTERONE ACETATE 150 MG/ML
150 INJECTION, SUSPENSION INTRAMUSCULAR ONCE
Status: COMPLETED | OUTPATIENT
Start: 2022-09-07 | End: 2022-09-07

## 2022-09-07 RX ORDER — METHYLPREDNISOLONE 4 MG/1
TABLET ORAL SEE ADMIN INSTRUCTIONS
COMMUNITY
Start: 2022-09-07 | End: 2022-09-07

## 2022-09-07 RX ORDER — METHOCARBAMOL 500 MG/1
TABLET, FILM COATED ORAL
COMMUNITY
Start: 2022-06-16

## 2022-09-07 RX ORDER — POLYETHYLENE GLYCOL 3350 17 G/17G
POWDER, FOR SOLUTION ORAL
COMMUNITY
Start: 2022-07-06 | End: 2022-11-21

## 2022-09-07 RX ORDER — DICYCLOMINE HYDROCHLORIDE 10 MG/1
CAPSULE ORAL
COMMUNITY
Start: 2022-04-29 | End: 2022-10-31 | Stop reason: SDUPTHER

## 2022-09-07 RX ORDER — ERGOCALCIFEROL 1.25 MG/1
CAPSULE ORAL
COMMUNITY
Start: 2022-07-11

## 2022-09-07 RX ORDER — POTASSIUM CHLORIDE 750 MG/1
10 TABLET, EXTENDED RELEASE ORAL DAILY
COMMUNITY

## 2022-09-07 RX ORDER — FAMOTIDINE 20 MG/1
20 TABLET, FILM COATED ORAL
COMMUNITY
Start: 2022-08-13 | End: 2022-10-31 | Stop reason: SDUPTHER

## 2022-09-07 RX ORDER — LEVOTHYROXINE SODIUM 88 UG/1
TABLET ORAL
COMMUNITY
Start: 2022-05-04

## 2022-09-07 RX ADMIN — MEDROXYPROGESTERONE ACETATE 150 MG: 150 INJECTION, SUSPENSION INTRAMUSCULAR at 14:36

## 2022-10-31 RX ORDER — DICYCLOMINE HYDROCHLORIDE 10 MG/1
10 CAPSULE ORAL
Qty: 60 CAPSULE | Refills: 3 | Status: SHIPPED | OUTPATIENT
Start: 2022-10-31

## 2022-10-31 RX ORDER — FAMOTIDINE 20 MG/1
20 TABLET, FILM COATED ORAL DAILY
Qty: 30 TABLET | Refills: 3 | Status: SHIPPED | OUTPATIENT
Start: 2022-10-31

## 2022-11-21 ENCOUNTER — OFFICE VISIT (OUTPATIENT)
Dept: GASTROENTEROLOGY | Facility: CLINIC | Age: 42
End: 2022-11-21

## 2022-11-21 VITALS
DIASTOLIC BLOOD PRESSURE: 67 MMHG | HEART RATE: 63 BPM | HEIGHT: 66 IN | SYSTOLIC BLOOD PRESSURE: 109 MMHG | OXYGEN SATURATION: 97 % | TEMPERATURE: 96.8 F | WEIGHT: 153.8 LBS | BODY MASS INDEX: 24.72 KG/M2

## 2022-11-21 DIAGNOSIS — K58.1 IRRITABLE BOWEL SYNDROME WITH CONSTIPATION: Primary | ICD-10-CM

## 2022-11-21 DIAGNOSIS — K21.9 GASTROESOPHAGEAL REFLUX DISEASE WITHOUT ESOPHAGITIS: ICD-10-CM

## 2022-11-21 DIAGNOSIS — Z80.0 FH: COLON CANCER: ICD-10-CM

## 2022-11-21 PROCEDURE — 99214 OFFICE O/P EST MOD 30 MIN: CPT | Performed by: NURSE PRACTITIONER

## 2022-11-21 RX ORDER — ALLOPURINOL 100 MG/1
TABLET ORAL
COMMUNITY
Start: 2022-08-17

## 2022-11-21 NOTE — PROGRESS NOTES
Chief Complaint   Patient presents with   • IBS-C   • Bloated       Brooklynn Perez is a  42 y.o. female here for a follow up visit for IBS.    HPI  42-year-old female presents today for follow-up visit for IBS-C.  He is a patient of Dr. Jolly.  She was last seen in the office by her on 7/6/2022.  She has a history of IBS-C and admits that she is really bad about remembering to take her MiraLAX regularly.  When she does take the MiraLAX every day her bowels move so much better.  She admits lately she has had a lot of gas and bloating.  She does use bentyl as needed.  She also has a history of GERD and admits as long as she takes her Prilosec she does really well.  She does me she has ran out of that prescription and needs a refill.  Her last colonoscopy was in 2017.  She is never had an EGD before.  She does have a history of appendectomy and thyroidectomy.  She denies any dysphagia, nausea and vomiting, diarrhea, rectal bleeding or melena.  She admits her appetite is okay and her weight is up 14 pounds since September.  She does have a history of Graves' disease. Does have a GI family history of colon cancer with a maternal aunt and a maternal uncle.  Past Medical History:   Diagnosis Date   • Asthma    • Bipolar disorder (HCC)    • GERD (gastroesophageal reflux disease)    • Graves disease    • Hyperlipidemia    • Hypertension    • IBS (irritable bowel syndrome)        Past Surgical History:   Procedure Laterality Date   • APPENDECTOMY     • COLONOSCOPY  2017   • THYROIDECTOMY  08/2019   • TUBAL ABDOMINAL LIGATION         Scheduled Meds:    Continuous Infusions:No current facility-administered medications for this visit.      PRN Meds:.    Allergies   Allergen Reactions   • Diphenhydramine Hives and Anaphylaxis   • Fexofenadine-Pseudoephed Er Anaphylaxis   • Fish Oil Anaphylaxis   • Liothyronine Rash   • Mechlorethamine Anaphylaxis   • Allyl Isothiocyanate Hives   • Antihistamines, Chlorpheniramine-Type Itching    • Antihistamines, Diphenhydramine-Type Itching and Rash     Benadryl cream  Benadryl cream  Benadryl cream   • Codeine Itching, Other (See Comments) and Rash   • Grape (Artificial) Flavor Hives   • Iodine Rash   • Latex Rash and Hives       Social History     Socioeconomic History   • Marital status: Unknown   Tobacco Use   • Smoking status: Former     Types: Cigars   • Smokeless tobacco: Never   Substance and Sexual Activity   • Alcohol use: Yes     Comment: rarely   • Drug use: No   • Sexual activity: Not Currently     Partners: Male     Birth control/protection: Tubal ligation       Family History   Problem Relation Age of Onset   • Colon cancer Maternal Aunt    • Colon cancer Maternal Uncle    • Breast cancer Neg Hx    • Ovarian cancer Neg Hx    • Uterine cancer Neg Hx    • Deep vein thrombosis Neg Hx    • Pulmonary embolism Neg Hx        Review of Systems   Constitutional: Negative for appetite change, chills, diaphoresis, fatigue, fever and unexpected weight change.   HENT: Negative for nosebleeds, postnasal drip, sore throat, trouble swallowing and voice change.    Respiratory: Negative for cough, choking, chest tightness, shortness of breath, wheezing and stridor.    Cardiovascular: Negative for chest pain, palpitations and leg swelling.   Gastrointestinal: Positive for abdominal distention and constipation. Negative for abdominal pain, anal bleeding, blood in stool, diarrhea, nausea, rectal pain and vomiting.   Endocrine: Negative for polydipsia, polyphagia and polyuria.   Musculoskeletal: Negative for gait problem.   Skin: Negative for rash and wound.   Allergic/Immunologic: Negative for food allergies.   Neurological: Negative for dizziness, speech difficulty and light-headedness.   Psychiatric/Behavioral: Negative for confusion, self-injury, sleep disturbance and suicidal ideas.       Vitals:    11/21/22 1433   BP: 109/67   Pulse: 63   Temp: 96.8 °F (36 °C)   SpO2: 97%       Physical  Exam  Constitutional:       General: She is not in acute distress.     Appearance: She is well-developed. She is not ill-appearing.   HENT:      Head: Normocephalic.   Eyes:      Pupils: Pupils are equal, round, and reactive to light.   Cardiovascular:      Rate and Rhythm: Normal rate and regular rhythm.      Heart sounds: Normal heart sounds.   Pulmonary:      Effort: Pulmonary effort is normal.      Breath sounds: Normal breath sounds.   Abdominal:      General: Bowel sounds are normal. There is no distension.      Palpations: Abdomen is soft. There is no mass.      Tenderness: There is no abdominal tenderness. There is no guarding or rebound.      Hernia: No hernia is present.   Musculoskeletal:         General: Normal range of motion.   Skin:     General: Skin is warm and dry.   Neurological:      Mental Status: She is alert and oriented to person, place, and time.   Psychiatric:         Speech: Speech normal.         Behavior: Behavior normal.         Judgment: Judgment normal.         No radiology results for the last 7 days     Diagnoses and all orders for this visit:    1. Irritable bowel syndrome with constipation (Primary)    2. Gastroesophageal reflux disease without esophagitis    3. FH: colon cancer    IBS-C is not well controlled.  She really needs to take this MiraLAX every day.  We discussed again how it is better for her to take it routinely.  She admits she will try to do better.  Continue bentyl as needed.  GERD seems well controlled on omeprazole when she takes it.  I will refill it today.  Continue GERD precautions.  Patient is due for screening colonoscopy again.  We need to get her constipation under better control before we pursue another scope.  Patient to call the office with any issues.  Patient to follow-up in 6 months.  Patient is agreeable to the plan.

## 2022-11-30 ENCOUNTER — CLINICAL SUPPORT (OUTPATIENT)
Dept: OBSTETRICS AND GYNECOLOGY | Facility: CLINIC | Age: 42
End: 2022-11-30

## 2022-11-30 DIAGNOSIS — Z30.42 DEPO-PROVERA CONTRACEPTIVE STATUS: Primary | ICD-10-CM

## 2022-11-30 PROCEDURE — 96372 THER/PROPH/DIAG INJ SC/IM: CPT | Performed by: OBSTETRICS & GYNECOLOGY

## 2022-11-30 RX ORDER — OXCARBAZEPINE 300 MG/1
TABLET, FILM COATED ORAL
COMMUNITY
Start: 2022-11-10

## 2022-11-30 RX ORDER — RISPERIDONE 1 MG/1
TABLET ORAL
COMMUNITY
Start: 2022-11-10

## 2022-11-30 RX ORDER — CITALOPRAM 10 MG/1
TABLET ORAL
COMMUNITY
Start: 2022-11-10

## 2022-11-30 RX ORDER — DICLOFENAC SODIUM 20 MG/G
SOLUTION TOPICAL
COMMUNITY

## 2022-11-30 RX ORDER — MEDROXYPROGESTERONE ACETATE 150 MG/ML
150 INJECTION, SUSPENSION INTRAMUSCULAR ONCE
Status: COMPLETED | OUTPATIENT
Start: 2022-11-30 | End: 2022-11-30

## 2022-11-30 RX ORDER — LEVOTHYROXINE SODIUM 88 UG/1
88 TABLET ORAL
COMMUNITY
Start: 2022-11-14

## 2022-11-30 RX ORDER — DULOXETIN HYDROCHLORIDE 30 MG/1
CAPSULE, DELAYED RELEASE ORAL
COMMUNITY
Start: 2022-11-10

## 2022-11-30 RX ORDER — POLYETHYLENE GLYCOL 3350 17 G/17G
POWDER, FOR SOLUTION ORAL
COMMUNITY
Start: 2022-11-25

## 2022-11-30 RX ORDER — BUPROPION HYDROCHLORIDE 150 MG/1
TABLET ORAL
COMMUNITY

## 2022-11-30 RX ADMIN — MEDROXYPROGESTERONE ACETATE 150 MG: 150 INJECTION, SUSPENSION INTRAMUSCULAR at 14:45

## 2023-03-20 ENCOUNTER — TELEPHONE (OUTPATIENT)
Dept: OBSTETRICS AND GYNECOLOGY | Facility: CLINIC | Age: 43
End: 2023-03-20
Payer: COMMERCIAL

## 2023-03-20 NOTE — TELEPHONE ENCOUNTER
Pt called to schedule depo.  States she was in in February for last depo. We have her marked as a no show for that day.  I show last injection in November 2021.  Pt reports she called and cancelled due to PT appt, but change PT appt and came to our office anyway.  Pt also states she had her pap smear done in February when she got her depo.     I cannot verify any of this information.  Please advise on scheduling.     Pt # [phone]

## 2023-03-23 ENCOUNTER — CLINICAL SUPPORT (OUTPATIENT)
Dept: OBSTETRICS AND GYNECOLOGY | Facility: CLINIC | Age: 43
End: 2023-03-23
Payer: COMMERCIAL

## 2023-03-23 VITALS — WEIGHT: 155.6 LBS | BODY MASS INDEX: 25.11 KG/M2

## 2023-03-23 DIAGNOSIS — Z30.42 DEPO-PROVERA CONTRACEPTIVE STATUS: Primary | ICD-10-CM

## 2023-03-23 RX ORDER — MEDROXYPROGESTERONE ACETATE 150 MG/ML
150 INJECTION, SUSPENSION INTRAMUSCULAR ONCE
Status: COMPLETED | OUTPATIENT
Start: 2023-03-23 | End: 2023-03-23

## 2023-03-23 RX ADMIN — MEDROXYPROGESTERONE ACETATE 150 MG: 150 INJECTION, SUSPENSION INTRAMUSCULAR at 13:22

## 2023-06-09 RX ORDER — POLYETHYLENE GLYCOL 3350 17 G/17G
POWDER, FOR SOLUTION ORAL
Qty: 510 G | Refills: 2 | Status: SHIPPED | OUTPATIENT
Start: 2023-06-09

## 2023-08-09 RX ORDER — POLYETHYLENE GLYCOL 3350 17 G/17G
POWDER ORAL
Qty: 510 G | Refills: 1 | Status: SHIPPED | OUTPATIENT
Start: 2023-08-09

## 2023-08-25 RX ORDER — MEDROXYPROGESTERONE ACETATE 150 MG/ML
INJECTION, SUSPENSION INTRAMUSCULAR
Qty: 1 ML | Refills: 0 | Status: SHIPPED | OUTPATIENT
Start: 2023-08-25

## 2023-09-01 NOTE — TELEPHONE ENCOUNTER
Left message for Da'shame to let her know that it is time to schedule AE. Last ae 5/5/22/ Please call the office at [phone] to schedule. Thank you

## 2023-09-08 ENCOUNTER — CLINICAL SUPPORT (OUTPATIENT)
Dept: OBSTETRICS AND GYNECOLOGY | Facility: CLINIC | Age: 43
End: 2023-09-08
Payer: COMMERCIAL

## 2023-09-08 VITALS
BODY MASS INDEX: 23.3 KG/M2 | HEIGHT: 66 IN | WEIGHT: 145 LBS | SYSTOLIC BLOOD PRESSURE: 155 MMHG | DIASTOLIC BLOOD PRESSURE: 98 MMHG

## 2023-09-08 DIAGNOSIS — Z30.42 ENCOUNTER FOR SURVEILLANCE OF INJECTABLE CONTRACEPTIVE: Primary | ICD-10-CM

## 2023-09-08 RX ORDER — MEDROXYPROGESTERONE ACETATE 150 MG/ML
150 INJECTION, SUSPENSION INTRAMUSCULAR ONCE
Status: COMPLETED | OUTPATIENT
Start: 2023-09-08 | End: 2023-09-08

## 2023-09-08 RX ADMIN — MEDROXYPROGESTERONE ACETATE 150 MG: 150 INJECTION, SUSPENSION INTRAMUSCULAR at 13:19

## 2023-09-08 NOTE — PROGRESS NOTES
Patient here for depo provera injection 150 mg   Left deltoid tolerated without a reaction  [id number]  LOT [id number]  EXp 12/31/2026

## 2023-09-13 RX ORDER — MEDROXYPROGESTERONE ACETATE 150 MG/ML
INJECTION, SUSPENSION INTRAMUSCULAR
Qty: 1 ML | Refills: 0 | OUTPATIENT
Start: 2023-09-13

## 2023-10-04 RX ORDER — MEDROXYPROGESTERONE ACETATE 150 MG/ML
INJECTION, SUSPENSION INTRAMUSCULAR
Qty: 1 ML | Refills: 0 | OUTPATIENT
Start: 2023-10-04

## 2023-10-05 ENCOUNTER — OFFICE VISIT (OUTPATIENT)
Dept: GASTROENTEROLOGY | Facility: CLINIC | Age: 43
End: 2023-10-05
Payer: COMMERCIAL

## 2023-10-05 VITALS
TEMPERATURE: 97.7 F | OXYGEN SATURATION: 95 % | HEIGHT: 66 IN | DIASTOLIC BLOOD PRESSURE: 92 MMHG | HEART RATE: 82 BPM | WEIGHT: 146.1 LBS | SYSTOLIC BLOOD PRESSURE: 145 MMHG | BODY MASS INDEX: 23.48 KG/M2

## 2023-10-05 DIAGNOSIS — K59.09 OTHER CONSTIPATION: ICD-10-CM

## 2023-10-05 DIAGNOSIS — K21.9 GASTROESOPHAGEAL REFLUX DISEASE WITHOUT ESOPHAGITIS: ICD-10-CM

## 2023-10-05 DIAGNOSIS — K58.1 IRRITABLE BOWEL SYNDROME WITH CONSTIPATION: Primary | ICD-10-CM

## 2023-10-05 DIAGNOSIS — K64.3 GRADE IV HEMORRHOIDS: ICD-10-CM

## 2023-10-05 RX ORDER — OMEPRAZOLE 20 MG/1
20 TABLET, DELAYED RELEASE ORAL 2 TIMES DAILY
Qty: 168 TABLET | Refills: 4 | Status: CANCELLED | OUTPATIENT
Start: 2023-10-05

## 2023-10-05 RX ORDER — OMEPRAZOLE 40 MG/1
40 CAPSULE, DELAYED RELEASE ORAL DAILY
Qty: 180 CAPSULE | Refills: 3 | Status: CANCELLED | OUTPATIENT
Start: 2023-10-05

## 2023-10-05 RX ORDER — PANTOPRAZOLE SODIUM 40 MG/1
40 TABLET, DELAYED RELEASE ORAL DAILY
Qty: 90 TABLET | Refills: 3 | Status: SHIPPED | OUTPATIENT
Start: 2023-10-05

## 2023-10-05 NOTE — PROGRESS NOTES
"Chief Complaint  Heartburn and Irritable Bowel Syndrome    Subjective          History of Present Illness    Brooklynn Perez is a  43 y.o. female patient of Dr. Wells.  She has a history of irritable bowel syndrome, constipation, hemorrhoids, nausea, abdominal pain and GERD.  In addition she has a history of Graves' disease, her thyroid has been removed and they are having trouble getting her thyroid hormone regulated properly.  Does have a GI family history of colon cancer with a maternal aunt and a maternal uncle   last colonoscopy was in 2020 she was unable to tell me if there were any problems.    Bowels first thing in the morning. But feels Left lower quadrant cramping and incomplete evacuation of her bowels. States she has terrible hemorrhoids and has bleeding with her bowel movements when she has frequent BMs. Reports a lot of bright red blood, filling up pad.   Not taking Bentyl insurance wouldn't cover it.  Objective   Vital Signs:   /92 (BP Location: Left arm, Patient Position: Sitting, Cuff Size: Adult)   Pulse 82   Temp 97.7 °F (36.5 °C) (Temporal)   Ht 167.6 cm (65.98\")   Wt 66.3 kg (146 lb 1.6 oz)   SpO2 95%   BMI 23.60 kg/m²       Physical Exam  Constitutional:       Appearance: Normal appearance.   Pulmonary:      Effort: Pulmonary effort is normal. No respiratory distress.   Abdominal:      General: Abdomen is flat. Bowel sounds are normal. There is no distension.      Palpations: Abdomen is soft. There is no mass.      Tenderness: There is no abdominal tenderness. There is no guarding or rebound.      Hernia: No hernia is present.   Genitourinary:     Rectum: External hemorrhoid and internal hemorrhoid present.      Comments: Grade IV hemorrhoids noted  Skin:     General: Skin is warm and dry.   Neurological:      Mental Status: She is alert.   Psychiatric:         Mood and Affect: Mood normal.        Result Review :   The following data was reviewed by: MOOKIE Bain on " 10/05/2023:  CBC          12/29/2022    15:56 1/27/2023    16:32   CBC   WBC 8.39     12.66       RBC 4.65     4.80       Hemoglobin 14.9     15.4       Hematocrit 43.2     44.6       MCV 92.9     92.9       MCH 32.0     32.1       MCHC 34.5     34.5       RDW 13.4     13.2       Platelets 314     312          Details          This result is from an external source.                     Assessment and Plan    Diagnoses and all orders for this visit:    1. Irritable bowel syndrome with constipation (Primary)    2. Other constipation    3. Gastroesophageal reflux disease without esophagitis    4. Grade IV hemorrhoids  -     Ambulatory Referral to Colorectal Surgery    Other orders  -     pantoprazole (PROTONIX) 40 MG EC tablet; Take 1 tablet by mouth Daily.  Dispense: 90 tablet; Refill: 3  -     hydrocortisone 2.5 % cream; Apply 1 application  topically to the appropriate area as directed 2 (Two) Times a Day.  Dispense: 30 g; Refill: 1    Continue the Miralax daily to keep bowels moving  Samples given of anal pram and recticare  Will send script for the hydrocortisone cream 2.5%. Will plan to refer patient to a surgeon for her hemorrhoids.       Follow Up   Return in about 3 months (around 1/5/2024).    Dragon dictation used throughout this note.     OMOKIE Bain

## 2023-10-05 NOTE — Clinical Note
Bowels good when she takes miralax. Has some cramping, however ins won't cover levsin or bentyl. I suggested IB Guard, however I would imagine it will be too expensive. She has stage 3 and 4 hemorrhoids. Referred her to colorectal surgeon in [facility] [place]. She is going to try pantoprazole 40 daily for her heartburn reports when she takes the omeprazole 40 she has constipation so she takes 20mg but can't afford it OTC and ins won't cover it. Last C/S she thinks was in 2020 I don't see anything on our system or care everywhere. She does report a sig amount of bright red bleeding with freq Bms but not every time. Has a fam hx of colon ca maternal aunt and uncle. Colonoscopy?

## 2023-10-11 ENCOUNTER — TELEPHONE (OUTPATIENT)
Dept: GASTROENTEROLOGY | Facility: CLINIC | Age: 43
End: 2023-10-11

## 2023-10-11 DIAGNOSIS — K62.5 RECTAL BLEEDING: Primary | ICD-10-CM

## 2023-10-12 ENCOUNTER — TELEPHONE (OUTPATIENT)
Dept: GASTROENTEROLOGY | Facility: CLINIC | Age: 43
End: 2023-10-12
Payer: COMMERCIAL

## 2023-10-12 PROBLEM — K62.5 RECTAL BLEEDING: Status: ACTIVE | Noted: 2023-10-11

## 2023-10-12 NOTE — TELEPHONE ENCOUNTER
Name: BERE LOVE          Relationship:SELF          Best Callback Number: [phone]         HUB PROVIDED THE RELAY MESSAGE FROM THE OFFICE    PATIENT VOICED UNDERSTANDING AND HAS NO FURTHER QUESTIONS AT THIS TIME    ADDITIONAL INFORMATION:

## 2023-10-12 NOTE — TELEPHONE ENCOUNTER
COLONOSCOPY 1/09/2024  arrive at  11AM  [facility]  . mailed prep instructions to verified address on file....miralax OK FOR HUB TO READ

## 2023-10-23 ENCOUNTER — TELEPHONE (OUTPATIENT)
Dept: GASTROENTEROLOGY | Facility: CLINIC | Age: 43
End: 2023-10-23
Payer: COMMERCIAL

## 2023-10-23 NOTE — TELEPHONE ENCOUNTER
SENT KIRAN TO THE [facility] FOR THIS PATIENT GASTRO PROCEDURE IN JN 09 2024   LVM FOR PATIENT AS WELL OK FOR THE HUB TO READ

## 2023-10-26 RX ORDER — POLYETHYLENE GLYCOL 3350 17 G/17G
POWDER ORAL
Qty: 510 G | Refills: 2 | Status: SHIPPED | OUTPATIENT
Start: 2023-10-26

## 2023-10-26 NOTE — TELEPHONE ENCOUNTER
Patient states she missed a phone call and will keep her phone with her.      Dermal Autograft Text: The defect edges were debeveled with a #15 scalpel blade.  Given the location of the defect, shape of the defect and the proximity to free margins a dermal autograft was deemed most appropriate.  Using a sterile surgical marker, the primary defect shape was transferred to the donor site. The area thus outlined was incised deep to adipose tissue with a #15 scalpel blade.  The harvested graft was then trimmed of adipose and epidermal tissue until only dermis was left.  The skin graft was then placed in the primary defect and oriented appropriately.

## 2023-11-01 ENCOUNTER — TELEPHONE (OUTPATIENT)
Dept: GASTROENTEROLOGY | Facility: CLINIC | Age: 43
End: 2023-11-01
Payer: COMMERCIAL

## 2023-11-01 NOTE — TELEPHONE ENCOUNTER
See if she is taking miralax - have her decrease this to every other day if she is taking this.  If the bowel issues do not improve with decrease in miralax, have her hold it completely.  Hard to tell if protonixx is contributing to this given constipation hx with laxative use

## 2023-11-01 NOTE — TELEPHONE ENCOUNTER
Caller: Ana, Da'shame T    Relationship: Self    Best call back number: [phone]     Which medication are you concerned about: pantoprazole (PROTONIX) 40 MG EC tablet AND hydrocortisone 2.5 % cream     Who prescribed you this medication: TONIA BAILEY    When did you start taking this medication: PRESCRIBED 10/5/23    What are your concerns: PATIENT STATES THAT PROTONIX IS CAUSING HER TO HAVE BAD CRAMPING IN LOWER ABDOMINAL/PELVIC AREA AND THAT SHE IS TYPICALLY GOING TO THE BATHROOM 5 TIMES A DAY WHILE ON IT. THE CRAMPING IS INTERFERING WITH HER DAILY ACTIVITIES. SHE ALSO STATES THAT THE HYDROCORTISONE CREAM HAS NOT BEEN HELPING AND SHE IS STILL EXPERIENCING BRIGHT RED RECTAL BLEEDING. SHE REPORTS HAVING LOST 10 POUNDS RECENTLY. PLEASE CALL BACK ASAP TO ADVISE.

## 2023-11-01 NOTE — TELEPHONE ENCOUNTER
Called pt and pt reports she is currently at an appt and can not talk. Pt does report that she does not have a fever or chills. Advised pt that we will send message to Dr Jolly.

## 2023-11-02 DIAGNOSIS — K62.5 RECTAL BLEEDING: Primary | ICD-10-CM

## 2023-11-02 RX ORDER — FAMOTIDINE 40 MG/1
40 TABLET, FILM COATED ORAL DAILY
Qty: 30 TABLET | Refills: 3 | Status: SHIPPED | OUTPATIENT
Start: 2023-11-02

## 2023-11-02 NOTE — TELEPHONE ENCOUNTER
Called pt and passed on Dr Jolly's recommendations and the pt verbalized understanding.    Lab apt scheduled  Number to Dr Fox's office given

## 2023-11-02 NOTE — TELEPHONE ENCOUNTER
Pepcid sent as alternative to protonix.  Pls give her # to call and schedule colorectal appt for hemorrhoids

## 2023-11-02 NOTE — TELEPHONE ENCOUNTER
Hub staff attempted to follow warm transfer process and was unsuccessful     Caller: Love, Da'shame T    Relationship to patient: Self    Best call back number: [phone]     Patient is needing: RETURNING A CALL FROM MALA REGARDING DR. MCCLELLAN'S RECOMMENDATIONS. PLEASE CALL BACK ASAP TO ADVISE.

## 2023-11-02 NOTE — TELEPHONE ENCOUNTER
Returned call to pt.    Called and passed on Dr Jolly's recommendations.  Pt stated since starting the pantoprazole she has NOT been taking the MiraLax (even though, when I asked her at first of she was taking MiraLax and she told me yes)    She insisted that the Pantoprazole is causing the diarrhea, and I have advised her to hold this until I hear back from Dr Jolly.    She is also telling me that she is bleeding from her rectum for the last 2 months.  She stated she has Hemorrhoids and the cream she is using is not helping with this.    She is scheduled for a colonoscopy on 1/9/23    Looks like there is a referral for colorectal surgery to see, I do not see that she is scheduled.

## 2023-11-03 ENCOUNTER — LAB (OUTPATIENT)
Dept: GASTROENTEROLOGY | Facility: CLINIC | Age: 43
End: 2023-11-03
Payer: COMMERCIAL

## 2023-11-04 LAB
HCT VFR BLD AUTO: 45.7 % (ref 34–46.6)
HGB BLD-MCNC: 15.7 G/DL (ref 11.1–15.9)

## 2023-11-06 ENCOUNTER — TELEPHONE (OUTPATIENT)
Dept: GASTROENTEROLOGY | Facility: CLINIC | Age: 43
End: 2023-11-06
Payer: COMMERCIAL

## 2023-11-06 NOTE — TELEPHONE ENCOUNTER
----- Message from Macy Jolly MD sent at 11/6/2023  6:25 AM EST -----  Pls let her know that her hemoglobin is normal

## 2023-12-01 ENCOUNTER — OFFICE VISIT (OUTPATIENT)
Dept: SURGERY | Facility: CLINIC | Age: 43
End: 2023-12-01
Payer: COMMERCIAL

## 2023-12-01 VITALS
SYSTOLIC BLOOD PRESSURE: 140 MMHG | OXYGEN SATURATION: 100 % | HEIGHT: 66 IN | WEIGHT: 142.7 LBS | BODY MASS INDEX: 22.93 KG/M2 | HEART RATE: 81 BPM | DIASTOLIC BLOOD PRESSURE: 96 MMHG

## 2023-12-01 DIAGNOSIS — K64.8 INTERNAL HEMORRHOIDS WITH COMPLICATION: Primary | ICD-10-CM

## 2023-12-01 DIAGNOSIS — K59.03 DRUG-INDUCED CONSTIPATION: ICD-10-CM

## 2023-12-01 RX ORDER — DICLOFENAC SODIUM 75 MG/1
75 TABLET, DELAYED RELEASE ORAL 2 TIMES DAILY
COMMUNITY
End: 2023-12-01

## 2023-12-01 RX ORDER — CHLORAL HYDRATE 500 MG
CAPSULE ORAL
COMMUNITY

## 2023-12-01 RX ORDER — ATORVASTATIN CALCIUM 10 MG/1
10 TABLET, FILM COATED ORAL NIGHTLY
COMMUNITY
Start: 2023-05-04

## 2023-12-01 RX ORDER — SODIUM CHLORIDE 0.9 % (FLUSH) 0.9 %
3 SYRINGE (ML) INJECTION EVERY 12 HOURS SCHEDULED
OUTPATIENT
Start: 2023-12-01

## 2023-12-01 RX ORDER — OMEPRAZOLE 20 MG/1
20 CAPSULE, DELAYED RELEASE ORAL DAILY
COMMUNITY

## 2023-12-01 RX ORDER — PANTOPRAZOLE SODIUM 40 MG/1
40 TABLET, DELAYED RELEASE ORAL DAILY
COMMUNITY
Start: 2023-11-03 | End: 2023-12-01

## 2023-12-01 RX ORDER — METHOCARBAMOL 750 MG/1
750 TABLET, FILM COATED ORAL 3 TIMES DAILY
COMMUNITY
Start: 2023-11-10

## 2023-12-01 RX ORDER — RISPERIDONE 2 MG/1
2 TABLET ORAL DAILY
COMMUNITY
Start: 2023-05-04

## 2023-12-01 RX ORDER — CYCLOBENZAPRINE HCL 10 MG
10 TABLET ORAL 2 TIMES DAILY PRN
COMMUNITY
Start: 2023-09-05

## 2023-12-01 RX ORDER — METRONIDAZOLE 500 MG/100ML
500 INJECTION, SOLUTION INTRAVENOUS ONCE
OUTPATIENT
Start: 2023-12-01 | End: 2023-12-01

## 2023-12-01 RX ORDER — SODIUM CHLORIDE 9 MG/ML
40 INJECTION, SOLUTION INTRAVENOUS AS NEEDED
OUTPATIENT
Start: 2023-12-01

## 2023-12-01 RX ORDER — TIOTROPIUM BROMIDE INHALATION SPRAY 1.56 UG/1
2 SPRAY, METERED RESPIRATORY (INHALATION)
COMMUNITY
Start: 2023-05-04

## 2023-12-01 RX ORDER — LEVOTHYROXINE SODIUM 0.1 MG/1
100 TABLET ORAL DAILY
COMMUNITY
End: 2023-12-01 | Stop reason: DRUGHIGH

## 2023-12-01 RX ORDER — CITALOPRAM 20 MG/1
10 TABLET ORAL DAILY
COMMUNITY
Start: 2023-05-04 | End: 2023-12-01 | Stop reason: DRUGHIGH

## 2023-12-01 RX ORDER — GUAIFENESIN 600 MG/1
600 TABLET, EXTENDED RELEASE ORAL 2 TIMES DAILY
COMMUNITY

## 2023-12-01 RX ORDER — ROPINIROLE 0.25 MG/1
0.25 TABLET, FILM COATED ORAL 3 TIMES DAILY
COMMUNITY

## 2023-12-01 RX ORDER — ALBUTEROL SULFATE 90 UG/1
2 AEROSOL, METERED RESPIRATORY (INHALATION)
COMMUNITY

## 2023-12-01 RX ORDER — SODIUM CHLORIDE 0.9 % (FLUSH) 0.9 %
3-10 SYRINGE (ML) INJECTION AS NEEDED
OUTPATIENT
Start: 2023-12-01

## 2023-12-01 RX ORDER — ONDANSETRON 4 MG/1
4 TABLET, ORALLY DISINTEGRATING ORAL EVERY 8 HOURS PRN
COMMUNITY
End: 2023-12-01

## 2023-12-01 RX ORDER — BUPROPION HYDROCHLORIDE 300 MG/1
300 TABLET ORAL EVERY MORNING
COMMUNITY
Start: 2023-05-04 | End: 2023-12-01 | Stop reason: DRUGHIGH

## 2023-12-01 NOTE — PROGRESS NOTES
Brooklynn Perez is a 43 y.o. female who is seen as a consult at the request of MOOKIE Bain for Rectal Bleeding.      HPI:    Pt presents today for evaluation of hemorrhoids.   Hemorrhoids first flared-up after having children.  Children are between 7 & 21 years old.    Hemorrhoids are painful and bleed.   Tries to reduce them, but is unable to anymore.   Was seen by GI on 10/05/2023 and given HC 2.5% cream.   Cream helped with swelling, but not pain or bleeding.  Currently out of cream.    Pt takes chronic PO pain medication due to herniated disc and arthritis.   This causes constipation for which she takes Miralax QD with improvement.   Has a BM daily.  Tieton: 4  No straining  Denies taking fiber or stool softeners.     Not taking ASA or anticoagulation.   No previous anorectal surgeries.     Last colonoscopy in 2017 per Pt. She is unsure of results.  Scheduled for a colonoscopy on 01/09/2024 with Dr. Jolly.   No known FHx of colon polyps, colon cancer, or IBD.      Past Medical History:   Diagnosis Date    Asthma     Bipolar disorder     GERD (gastroesophageal reflux disease)     Graves disease     Hyperlipidemia     Hypertension     IBS (irritable bowel syndrome)        Past Surgical History:   Procedure Laterality Date    APPENDECTOMY      COLONOSCOPY  2017    THYROIDECTOMY  08/2019    TUBAL ABDOMINAL LIGATION         Social History:   reports that she has quit smoking. Her smoking use included cigars. She has been exposed to tobacco smoke. She has never used smokeless tobacco. She reports current alcohol use. She reports that she does not use drugs.      Marriage status: Single    Family History   Problem Relation Age of Onset    Colon cancer Maternal Aunt     Colon cancer Maternal Uncle     Breast cancer Neg Hx     Ovarian cancer Neg Hx     Uterine cancer Neg Hx     Deep vein thrombosis Neg Hx     Pulmonary embolism Neg Hx          Current Outpatient Medications:     albuterol (PROVENTIL) (2.5  MG/3ML) 0.083% nebulizer solution, , Disp: , Rfl:     ALLERGY SERUM INJECTION, Inject  under the skin into the appropriate area as directed 1 (One) Time., Disp: , Rfl:     amLODIPine (NORVASC) 10 MG tablet, Take 1 tablet by mouth Daily., Disp: , Rfl:     azelastine (ASTELIN) 0.1 % nasal spray, 2 sprays into the nostril(s) as directed by provider Daily., Disp: , Rfl:     buPROPion SR (WELLBUTRIN SR) 150 MG 12 hr tablet, Wellbutrin  mg tablet, 12 hr sustained-release  Take 1 tablet every day by oral route., Disp: , Rfl:     cetirizine (zyrTEC) 10 MG tablet, Take 1 tablet by mouth Daily., Disp: , Rfl:     chlorthalidone (HYGROTON) 25 MG tablet, Take 1 tablet by mouth Daily., Disp: 30 tablet, Rfl: 1    citalopram (CeleXA) 10 MG tablet, , Disp: , Rfl:     cyclobenzaprine (FLEXERIL) 10 MG tablet, Take 1 tablet by mouth 2 (Two) Times a Day As Needed., Disp: , Rfl:     dicyclomine (BENTYL) 10 MG capsule, Take 1 capsule by mouth 4 (Four) Times a Day Before Meals & at Bedtime., Disp: 60 capsule, Rfl: 3    DULOXETINE HCL PO, Take 60 mg by mouth Daily., Disp: , Rfl:     EPINEPHrine (EPIPEN) 0.3 MG/0.3ML solution auto-injector injection, epinephrine 0.3 mg/0.3 mL injection, auto-injector, Disp: , Rfl:     famotidine (Pepcid) 40 MG tablet, Take 1 tablet by mouth Daily. (Patient taking differently: Take 0.5 tablets by mouth Daily.), Disp: 30 tablet, Rfl: 3    guaiFENesin (MUCINEX) 600 MG 12 hr tablet, Take 1 tablet by mouth 2 (Two) Times a Day., Disp: , Rfl:     HYDROcodone-acetaminophen (NORCO) 5-325 MG per tablet, Every 8 (Eight) Hours., Disp: , Rfl:     ipratropium-albuterol (DUO-NEB) 0.5-2.5 mg/3 ml nebulizer, Inhale 3 mL 4 (Four) Times a Day., Disp: , Rfl:     levothyroxine (SYNTHROID, LEVOTHROID) 88 MCG tablet, Take 1 tablet by mouth., Disp: , Rfl:     medroxyPROGESTERone Acetate 150 MG/ML suspension prefilled syringe, INJECT 1 ML INTO THE APPROPRIATE MUSCLE AS DIRECTED EVERY 3 MONTHS, Disp: 1 mL, Rfl: 0     methocarbamol (ROBAXIN) 750 MG tablet, Take 1 tablet by mouth 3 (Three) Times a Day., Disp: , Rfl:     metoprolol succinate XL (TOPROL-XL) 50 MG 24 hr tablet, Take 0.5 tablets by mouth Daily., Disp: , Rfl:     montelukast (SINGULAIR) 10 MG tablet, Take 1 tablet by mouth Daily., Disp: , Rfl:     Multivitamin tablet tablet, , Disp: , Rfl:     Omega-3 Fatty Acids (fish oil) 1000 MG capsule capsule, Take  by mouth Daily With Breakfast., Disp: , Rfl:     omeprazole (priLOSEC) 20 MG capsule, Take 1 capsule by mouth Daily., Disp: , Rfl:     OXcarbazepine (TRILEPTAL) 300 MG tablet, , Disp: , Rfl:     polyethylene glycol (PEG 3350) 17 GM/SCOOP powder, MIX 17 GRAMS (1 CAP FULL) INTO 4 TO 8 OUNCES OF WATER AND DRINK DAILY, Disp: 510 g, Rfl: 2    potassium chloride (K-DUR,KLOR-CON) 10 MEQ CR tablet, Take 1 tablet by mouth Daily., Disp: , Rfl:     risperiDONE (risperDAL) 2 MG tablet, Take 1 tablet by mouth Daily., Disp: , Rfl:     rOPINIRole (REQUIP) 0.25 MG tablet, Take 1 tablet by mouth 3 (Three) Times a Day., Disp: , Rfl:     sodium chloride 0.65 % nasal spray, Deep Sea Nasal 0.65 % spray aerosol, Disp: , Rfl:     Spiriva Respimat 1.25 MCG/ACT aerosol solution inhaler, Inhale 2 puffs Daily., Disp: , Rfl:     Symbicort 160-4.5 MCG/ACT inhaler, , Disp: , Rfl:     Ventolin  (90 Base) MCG/ACT inhaler, Inhale 2 puffs 4 (Four) Times a Day., Disp: , Rfl:     atorvastatin (LIPITOR) 10 MG tablet, Take 1 tablet by mouth Every Night. (Patient not taking: Reported on 12/1/2023), Disp: , Rfl:     hydrocortisone 2.5 % cream, Apply 1 application  topically to the appropriate area as directed 2 (Two) Times a Day. (Patient not taking: Reported on 12/1/2023), Disp: 30 g, Rfl: 1    lidocaine (LIDODERM) 5 %, , Disp: , Rfl:     lisinopril (PRINIVIL,ZESTRIL) 40 MG tablet, lisinopril 40 mg tablet  Take 1 tablet every day by oral route as directed for 90 days. (Patient not taking: Reported on 12/1/2023), Disp: , Rfl:     propranolol (INDERAL)  20 MG tablet, propranolol 20 mg tablet  Take 1 tablet every day by oral route as directed for 22 days. (Patient not taking: Reported on 12/1/2023), Disp: , Rfl:     Allergy  Diphenhydramine; Fexofenadine-pseudoephed er; Fish oil; Iodine; Liothyronine; Mechlorethamine; Mustard seed; Allyl isothiocyanate; Antihistamines, chlorpheniramine-type; Antihistamines, diphenhydramine-type; Codeine; Grape (artificial) flavor; and Latex    Review of Systems   Constitutional: Positive for diaphoresis and malaise/fatigue. Negative for decreased appetite and weight gain.   HENT:  Negative for congestion, hearing loss and hoarse voice.    Eyes:  Negative for blurred vision, discharge and visual disturbance.   Cardiovascular:  Negative for chest pain, cyanosis and leg swelling.   Respiratory:  Negative for cough, shortness of breath, sleep disturbances due to breathing and snoring.    Endocrine: Positive for cold intolerance and heat intolerance.   Hematologic/Lymphatic: Does not bruise/bleed easily.   Skin:  Negative for itching, poor wound healing and skin cancer.   Musculoskeletal:  Positive for back pain. Negative for arthritis, joint pain and joint swelling.   Gastrointestinal:  Positive for abdominal pain and hematochezia. Negative for change in bowel habit, bowel incontinence and constipation.   Genitourinary:  Negative for bladder incontinence, dysuria and hematuria.   Neurological:  Negative for brief paralysis, excessive daytime sleepiness, dizziness, focal weakness, headaches, light-headedness and weakness.   Psychiatric/Behavioral:  Negative for altered mental status and hallucinations. The patient does not have insomnia.    Allergic/Immunologic: Positive for environmental allergies. Negative for HIV exposure and persistent infections.   All other systems reviewed and are negative.      Vitals:    12/01/23 1340   BP: 140/96   Pulse: 81   SpO2: 100%     Body mass index is 23.03 kg/m².    Physical Exam  Exam conducted with  a chaperone present.   Constitutional:       General: She is not in acute distress.     Appearance: She is well-developed.   HENT:      Head: Normocephalic and atraumatic.      Nose: Nose normal.   Eyes:      Conjunctiva/sclera: Conjunctivae normal.      Pupils: Pupils are equal, round, and reactive to light.   Neck:      Trachea: No tracheal deviation.   Pulmonary:      Effort: Pulmonary effort is normal. No respiratory distress.      Breath sounds: Normal breath sounds.   Abdominal:      General: Bowel sounds are normal. There is no distension.      Palpations: Abdomen is soft.   Genitourinary:     Comments: Perianal exam: Mildly enlarged external hemorrhoids. Prolapsing internal hemorrhoid. Pt deferred CHAZ and anoscopy due to fear of pain.   Musculoskeletal:         General: No deformity. Normal range of motion.      Cervical back: Normal range of motion.   Skin:     General: Skin is warm and dry.   Neurological:      Mental Status: She is alert and oriented to person, place, and time.      Cranial Nerves: No cranial nerve deficit.      Coordination: Coordination normal.      Gait: Gait normal.   Psychiatric:         Behavior: Behavior normal.         Judgment: Judgment normal.         Review of Medical Record:  I reviewed medical records as detailed in HPI.     Assessment:  1. Internal hemorrhoids with complication    2. Drug-induced constipation    - New    Plan:  - Discussed common causes of hemorrhoid irritation and enlargement.  - Encouraged good bowel habits and avoidance of straining.   - Continue Miralax QD. Increase to BID as needed.  - Refill for HC cream provided. Apply Internally and Externally TID x7-10 Days.   - Discussed additional treatment options for the hemorrhoids including RBL vs hemorrhoidectomy. Both procedures, typical recovery time, expected outcomes, as well as risks and benefits discussed with Pt. Pt expresses understanding and wishes to proceed with a hemorrhoidectomy.   - Will  tentatively schedule surgery and arrange follow up with Dr. Ruddy lal.

## 2024-01-01 NOTE — TELEPHONE ENCOUNTER
----- Message from Александр Vale MD sent at 3/8/2018  3:09 PM EST -----  Celine, again shows some nodules in her thyroid. Is supposed to follow up with PCP about all of this. Thanks Dr. Vale   No

## 2024-01-01 NOTE — TELEPHONE ENCOUNTER
Left vmail to reschedule w/different provider ernestina is leaving office     Ok for hub to reschedule w/margie    MD Office

## 2024-01-03 ENCOUNTER — TELEPHONE (OUTPATIENT)
Dept: GASTROENTEROLOGY | Facility: CLINIC | Age: 44
End: 2024-01-03
Payer: COMMERCIAL

## 2024-01-03 ENCOUNTER — TELEPHONE (OUTPATIENT)
Dept: SURGERY | Facility: CLINIC | Age: 44
End: 2024-01-03

## 2024-01-03 NOTE — TELEPHONE ENCOUNTER
"  Caller: DA'SHAME LOVE    Relationship: SELF    Best call back number: [phone]     What is the best time to reach you: ANYTIME    Who are you requesting to speak with (clinical staff, provider,  specific staff member): SURGERY SCHEDULER    Do you know the name of the person who called: DA\"SHAME    What was the call regarding: PT IS NEEDING TO RESCHEDULE THE SURGERY SET FOR 1/25/24 DUE TO HER MOTHER BEING ILL AND WILL ALSO NEED THE PAT CANCELLED AS WELL    Is it okay if the provider responds through MyChart: NO - PREFERS A CALL, PLEASE          "

## 2024-01-05 ENCOUNTER — OFFICE VISIT (OUTPATIENT)
Dept: GASTROENTEROLOGY | Facility: CLINIC | Age: 44
End: 2024-01-05
Payer: COMMERCIAL

## 2024-01-05 VITALS
HEART RATE: 83 BPM | HEIGHT: 67 IN | TEMPERATURE: 98.2 F | DIASTOLIC BLOOD PRESSURE: 97 MMHG | WEIGHT: 144.6 LBS | SYSTOLIC BLOOD PRESSURE: 133 MMHG | BODY MASS INDEX: 22.7 KG/M2

## 2024-01-05 DIAGNOSIS — K59.09 OTHER CONSTIPATION: ICD-10-CM

## 2024-01-05 DIAGNOSIS — Z80.0 FH: COLON CANCER: ICD-10-CM

## 2024-01-05 DIAGNOSIS — K64.3 GRADE IV HEMORRHOIDS: Primary | ICD-10-CM

## 2024-01-05 DIAGNOSIS — K21.9 GASTROESOPHAGEAL REFLUX DISEASE WITHOUT ESOPHAGITIS: ICD-10-CM

## 2024-01-05 DIAGNOSIS — K62.5 RECTAL BLEEDING: ICD-10-CM

## 2024-01-05 DIAGNOSIS — K58.1 IRRITABLE BOWEL SYNDROME WITH CONSTIPATION: ICD-10-CM

## 2024-01-05 NOTE — Clinical Note
43-year-old female presenting today for follow-up for hemorrhoids and IBS-C.  IBS-C is currently managed with MiraLAX she has 1 bowel movement daily so I recommended she continue this.  She does have colonoscopy planned for early next week given rectal bleeding which is likely due to hemorrhoids.  At last visit we referred her to follow-up with colorectal surgery for hemorrhoids and she is currently scheduled for hemorrhoidectomy in March.  Until then I did send her some hydrocortisone 2.5% cream which she says does help a little bit.

## 2024-01-05 NOTE — PROGRESS NOTES
"Chief Complaint  Irritable Bowel Syndrome    Subjective          History of Present Illness    Brooklynn Perez is a  43 y.o. female presents for follow-up for GERD and IBS see.  She is a patient of Dr. Macy Jolly and is new to me.  Last seen in the office by MOOKIE Suh on 10/5/2023.    Says she is doing about the same since her last office visit with us.  She reports that she is scheduled for colonoscopy next week.  She says that she did follow-up with colorectal surgery regarding hemorrhoids last month and is scheduled for hemorrhoidectomy on 3/21/2024.  She says she would like to have this done sooner but has family obligations and will be able to commit to the recovery time until March.  She reports that she has been using hydrocortisone 2.5% cream for her hemorrhoids and believes this has helped a little bit although not much.  Otherwise she denies abdominal pain, weight loss, she generally does not have nausea and vomiting but did have 1 episode of vomiting last week.  She states that when she vomited her daughter had had a stomach bug so she thinks she may have just caught that.  No further episodes.  No hematemesis, coffee-ground emesis, melena.  She denies dizziness, fatigue, shortness of breath.  She is using MiraLAX daily and says that she has 1 formed bowel movement daily, denies straining.  Denies any reflux and says that her GERD is well-controlled on famotidine.    She is scheduled for colonoscopy on 1/9/2024.    Objective   Vital Signs:   /97   Pulse 83   Temp 98.2 °F (36.8 °C)   Ht 170.2 cm (67\")   Wt 65.6 kg (144 lb 9.6 oz)   BMI 22.65 kg/m²       Physical Exam  Constitutional:       General: She is not in acute distress.     Appearance: Normal appearance.   Eyes:      General: No scleral icterus.  Pulmonary:      Effort: Pulmonary effort is normal.   Skin:     Coloration: Skin is not jaundiced.   Neurological:      General: No focal deficit present.      Mental Status: She is " alert and oriented to person, place, and time.   Psychiatric:         Mood and Affect: Mood normal.         Behavior: Behavior normal.      Declined rectal exam     Result Review :   The following data was reviewed by: Sonia Farrell PA-C on 01/05/2024:  CMP          1/27/2023    16:32   CMP   Glucose 128       BUN 15       Creatinine 0.89       EGFR  Am >60       Sodium 138       Potassium 3.2       Chloride 98       Calcium 10.0       Total Protein 8.3       Albumin 4.7       Globulin 3.6       Total Bilirubin 0.8       Alkaline Phosphatase 82       AST (SGOT) 23       ALT (SGPT) 22       BUN/Creatinine Ratio 16.9       Anion Gap 11          Details          This result is from an external source.             CBC          1/27/2023    16:32 11/3/2023    00:00 11/21/2023    14:45   CBC   WBC 12.66      10.68       RBC 4.80      5.01       Hemoglobin 15.4     15.7  15.9       Hematocrit 44.6     45.7  46.7       MCV 92.9      93.2       MCH 32.1      31.7       MCHC 34.5      34.0       RDW 13.2      13.6       Platelets 312      354          Details          This result is from an external source.                     Assessment:   Diagnoses and all orders for this visit:    1. Grade IV hemorrhoids (Primary)  -     hydrocortisone 2.5 % cream; Apply 1 application  topically to the appropriate area as directed 2 (Two) Times a Day.  Dispense: 30 g; Refill: 1    2. FH: colon cancer    3. Irritable bowel syndrome with constipation    4. Gastroesophageal reflux disease without esophagitis    5. Other constipation    6. Rectal bleeding  -     hydrocortisone 2.5 % cream; Apply 1 application  topically to the appropriate area as directed 2 (Two) Times a Day.  Dispense: 30 g; Refill: 1          Plan:   Continue Pepcid  Will send refill of hydrocortisone 2.5% cream for hemorrhoids  Continue MiraLAX daily for constipation  Await findings of colonoscopy      Follow Up   No follow-ups on file.    Dragon dictation used  throughout this note.         Sonia Farrell PA-C  [facility]  [facility] [place] [address]  Office: [phone]

## 2024-01-09 ENCOUNTER — ANESTHESIA (OUTPATIENT)
Dept: GASTROENTEROLOGY | Facility: HOSPITAL | Age: 44
End: 2024-01-09
Payer: COMMERCIAL

## 2024-01-09 ENCOUNTER — ANESTHESIA EVENT (OUTPATIENT)
Dept: GASTROENTEROLOGY | Facility: HOSPITAL | Age: 44
End: 2024-01-09
Payer: COMMERCIAL

## 2024-01-09 ENCOUNTER — HOSPITAL ENCOUNTER (OUTPATIENT)
Facility: HOSPITAL | Age: 44
Setting detail: HOSPITAL OUTPATIENT SURGERY
Discharge: HOME | End: 2024-01-09
Attending: INTERNAL MEDICINE | Admitting: INTERNAL MEDICINE
Payer: COMMERCIAL

## 2024-01-09 VITALS
HEART RATE: 68 BPM | SYSTOLIC BLOOD PRESSURE: 144 MMHG | WEIGHT: 139.9 LBS | HEIGHT: 66 IN | RESPIRATION RATE: 20 BRPM | BODY MASS INDEX: 22.48 KG/M2 | TEMPERATURE: 98 F | OXYGEN SATURATION: 96 % | DIASTOLIC BLOOD PRESSURE: 122 MMHG

## 2024-01-09 DIAGNOSIS — K62.5 RECTAL BLEEDING: ICD-10-CM

## 2024-01-09 PROCEDURE — 25010000002 PROPOFOL 10 MG/ML EMULSION: Performed by: ANESTHESIOLOGY

## 2024-01-09 PROCEDURE — 81025 URINE PREGNANCY TEST: CPT | Performed by: INTERNAL MEDICINE

## 2024-01-09 PROCEDURE — 25810000003 LACTATED RINGERS PER 1000 ML: Performed by: INTERNAL MEDICINE

## 2024-01-09 PROCEDURE — 25810000003 LACTATED RINGERS PER 1000 ML: Performed by: ANESTHESIOLOGY

## 2024-01-09 PROCEDURE — 25010000002 GLYCOPYRROLATE 0.2 MG/ML SOLUTION: Performed by: ANESTHESIOLOGY

## 2024-01-09 PROCEDURE — S0260 H&P FOR SURGERY: HCPCS | Performed by: INTERNAL MEDICINE

## 2024-01-09 PROCEDURE — 88305 TISSUE EXAM BY PATHOLOGIST: CPT | Performed by: INTERNAL MEDICINE

## 2024-01-09 RX ORDER — GLYCOPYRROLATE 0.2 MG/ML
INJECTION INTRAMUSCULAR; INTRAVENOUS AS NEEDED
Status: DISCONTINUED | OUTPATIENT
Start: 2024-01-09 | End: 2024-01-09 | Stop reason: SURG

## 2024-01-09 RX ORDER — PROPOFOL 10 MG/ML
VIAL (ML) INTRAVENOUS AS NEEDED
Status: DISCONTINUED | OUTPATIENT
Start: 2024-01-09 | End: 2024-01-09 | Stop reason: SURG

## 2024-01-09 RX ORDER — LIDOCAINE HYDROCHLORIDE 20 MG/ML
INJECTION, SOLUTION INFILTRATION; PERINEURAL AS NEEDED
Status: DISCONTINUED | OUTPATIENT
Start: 2024-01-09 | End: 2024-01-09 | Stop reason: SURG

## 2024-01-09 RX ORDER — SODIUM CHLORIDE, SODIUM LACTATE, POTASSIUM CHLORIDE, CALCIUM CHLORIDE 600; 310; 30; 20 MG/100ML; MG/100ML; MG/100ML; MG/100ML
30 INJECTION, SOLUTION INTRAVENOUS CONTINUOUS PRN
Status: DISCONTINUED | OUTPATIENT
Start: 2024-01-09 | End: 2024-01-09 | Stop reason: HOSPADM

## 2024-01-09 RX ORDER — SODIUM CHLORIDE, SODIUM LACTATE, POTASSIUM CHLORIDE, CALCIUM CHLORIDE 600; 310; 30; 20 MG/100ML; MG/100ML; MG/100ML; MG/100ML
INJECTION, SOLUTION INTRAVENOUS CONTINUOUS PRN
Status: DISCONTINUED | OUTPATIENT
Start: 2024-01-09 | End: 2024-01-09 | Stop reason: SURG

## 2024-01-09 RX ADMIN — SODIUM CHLORIDE, POTASSIUM CHLORIDE, SODIUM LACTATE AND CALCIUM CHLORIDE 30 ML/HR: 600; 310; 30; 20 INJECTION, SOLUTION INTRAVENOUS at 11:38

## 2024-01-09 RX ADMIN — LIDOCAINE HYDROCHLORIDE 60 MG: 20 INJECTION, SOLUTION INFILTRATION; PERINEURAL at 12:35

## 2024-01-09 RX ADMIN — GLYCOPYRROLATE 0.2 MG: 0.2 INJECTION INTRAMUSCULAR; INTRAVENOUS at 12:35

## 2024-01-09 RX ADMIN — SODIUM CHLORIDE, POTASSIUM CHLORIDE, SODIUM LACTATE AND CALCIUM CHLORIDE: 600; 310; 30; 20 INJECTION, SOLUTION INTRAVENOUS at 12:31

## 2024-01-09 RX ADMIN — PROPOFOL 150 MG: 10 INJECTION, EMULSION INTRAVENOUS at 12:35

## 2024-01-09 RX ADMIN — PROPOFOL 200 MCG/KG/MIN: 10 INJECTION, EMULSION INTRAVENOUS at 12:35

## 2024-01-09 NOTE — ANESTHESIA PREPROCEDURE EVALUATION
Anesthesia Evaluation     Patient summary reviewed and Nursing notes reviewed   NPO Solid Status: > 6 hours  NPO Liquid Status: > 2 hours           Airway   Mallampati: II  TM distance: >3 FB  Neck ROM: full  Dental - normal exam     Pulmonary    (+) asthma,  (-) COPD, not a smoker  Cardiovascular   Exercise tolerance: good (4-7 METS)    (+) hypertension, hyperlipidemia  (-) past MI, dysrhythmias, angina      Neuro/Psych  (+) psychiatric history Bipolar  (-) seizures, CVA  GI/Hepatic/Renal/Endo    (+) GERD, thyroid problem (Graves disease)   (-) liver disease, no renal disease, diabetes    Musculoskeletal     Abdominal    Substance History      OB/GYN          Other                    Anesthesia Plan    ASA 3     MAC       Anesthetic plan, risks, benefits, and alternatives have been provided, discussed and informed consent has been obtained with: patient.    CODE STATUS:

## 2024-01-09 NOTE — H&P
[facility]  Pre Procedure History & Physical    Chief Complaint:   Rectal bleeding    Subjective     HPI:   44 yo here today for colonoscopy.  Pt reports FH CRC (GF).  Patient reports GI symptoms currrently- rectal bleeding and hemorrhoidal irritation.       Past Medical History:   Past Medical History:   Diagnosis Date    Asthma     Bipolar disorder     GERD (gastroesophageal reflux disease)     Graves disease     Hyperlipidemia     Hypertension     IBS (irritable bowel syndrome)        Past Surgical History:  Past Surgical History:   Procedure Laterality Date    APPENDECTOMY      COLONOSCOPY  2017    THYROIDECTOMY  08/2019    TUBAL ABDOMINAL LIGATION         Family History:  Family History   Problem Relation Age of Onset    Colon cancer Maternal Aunt     Colon cancer Maternal Uncle     Breast cancer Neg Hx     Ovarian cancer Neg Hx     Uterine cancer Neg Hx     Deep vein thrombosis Neg Hx     Pulmonary embolism Neg Hx     Malig Hyperthermia Neg Hx        Social History:   reports that she has quit smoking. Her smoking use included cigars. She has been exposed to tobacco smoke. She has never used smokeless tobacco. She reports current alcohol use. She reports that she does not use drugs.    Medications:   Medications Prior to Admission   Medication Sig Dispense Refill Last Dose    albuterol (PROVENTIL) (2.5 MG/3ML) 0.083% nebulizer solution Take 2.5 mg by nebulization Every 4 (Four) Hours As Needed for Shortness of Air or Wheezing.   1/8/2024    ALLERGY SERUM INJECTION Inject  under the skin into the appropriate area as directed 1 (One) Time.   1/8/2024    amLODIPine (NORVASC) 10 MG tablet Take 1 tablet by mouth Daily.   1/8/2024    atorvastatin (LIPITOR) 10 MG tablet Take 1 tablet by mouth Every Night.   1/8/2024    azelastine (ASTELIN) 0.1 % nasal spray 2 sprays into the nostril(s) as directed by provider Daily.   1/8/2024    buPROPion SR (WELLBUTRIN SR) 150 MG 12 hr tablet Wellbutrin SR  150 mg tablet, 12 hr sustained-release   Take 1 tablet every day by oral route.   1/8/2024    cetirizine (zyrTEC) 10 MG tablet Take 1 tablet by mouth Daily.   1/8/2024    chlorthalidone (HYGROTON) 25 MG tablet Take 1 tablet by mouth Daily. 30 tablet 1 1/8/2024    citalopram (CeleXA) 10 MG tablet Take 1 tablet by mouth Daily.   1/8/2024    cyclobenzaprine (FLEXERIL) 10 MG tablet Take 1 tablet by mouth 2 (Two) Times a Day As Needed.   1/8/2024    dicyclomine (BENTYL) 10 MG capsule Take 1 capsule by mouth 4 (Four) Times a Day Before Meals & at Bedtime. 60 capsule 3 1/8/2024    DULOXETINE HCL PO Take 60 mg by mouth Daily.   1/8/2024    famotidine (Pepcid) 40 MG tablet Take 1 tablet by mouth Daily. (Patient taking differently: Take 0.5 tablets by mouth Daily.) 30 tablet 3 1/8/2024    guaiFENesin (MUCINEX) 600 MG 12 hr tablet Take 1 tablet by mouth 2 (Two) Times a Day.   1/8/2024    HYDROcodone-acetaminophen (NORCO) 5-325 MG per tablet Take 1 tablet by mouth Every 8 (Eight) Hours.   1/8/2024    hydrocortisone 2.5 % cream Apply 1 application  topically to the appropriate area as directed 2 (Two) Times a Day. 30 g 1 1/8/2024    ipratropium-albuterol (DUO-NEB) 0.5-2.5 mg/3 ml nebulizer Inhale 3 mL 4 (Four) Times a Day.   1/8/2024    levothyroxine (SYNTHROID, LEVOTHROID) 88 MCG tablet Take 1 tablet by mouth Daily.   1/9/2024    lidocaine (LIDODERM) 5 % Place 1 patch on the skin as directed by provider Daily.   1/8/2024    lisinopril (PRINIVIL,ZESTRIL) 40 MG tablet Take 1 tablet by mouth Daily.   1/7/2024    medroxyPROGESTERone Acetate 150 MG/ML suspension prefilled syringe INJECT 1 ML INTO THE APPROPRIATE MUSCLE AS DIRECTED EVERY 3 MONTHS 1 mL 0 1/8/2024    methocarbamol (ROBAXIN) 750 MG tablet Take 1 tablet by mouth 3 (Three) Times a Day.   1/8/2024    metoprolol succinate XL (TOPROL-XL) 50 MG 24 hr tablet Take 0.5 tablets by mouth Daily.   1/8/2024    montelukast (SINGULAIR) 10 MG tablet Take 1 tablet by mouth Daily.    "1/8/2024    Multivitamin tablet tablet Take 1 tablet by mouth Daily.   1/8/2024    Omega-3 Fatty Acids (fish oil) 1000 MG capsule capsule Take  by mouth Daily With Breakfast.   1/8/2024    omeprazole (priLOSEC) 20 MG capsule Take 1 capsule by mouth Daily.   1/8/2024    OXcarbazepine (TRILEPTAL) 300 MG tablet Take 1 tablet by mouth 2 (Two) Times a Day.   1/8/2024    polyethylene glycol (PEG 3350) 17 GM/SCOOP powder MIX 17 GRAMS (1 CAP FULL) INTO 4 TO 8 OUNCES OF WATER AND DRINK DAILY 510 g 2 1/9/2024    potassium chloride (K-DUR,KLOR-CON) 10 MEQ CR tablet Take 1 tablet by mouth Daily.   1/8/2024    propranolol (INDERAL) 20 MG tablet Take 1 tablet by mouth 2 (Two) Times a Day.       risperiDONE (risperDAL) 2 MG tablet Take 1 tablet by mouth Daily.   1/8/2024    rOPINIRole (REQUIP) 0.25 MG tablet Take 1 tablet by mouth 3 (Three) Times a Day.   1/8/2024    sodium chloride 0.65 % nasal spray Deep Sea Nasal 0.65 % spray aerosol   1/8/2024    Spiriva Respimat 1.25 MCG/ACT aerosol solution inhaler Inhale 2 puffs Daily.   1/8/2024    Symbicort 160-4.5 MCG/ACT inhaler Inhale 2 puffs 2 (Two) Times a Day.   1/8/2024    Ventolin  (90 Base) MCG/ACT inhaler Inhale 2 puffs 4 (Four) Times a Day.   1/8/2024    EPINEPHrine (EPIPEN) 0.3 MG/0.3ML solution auto-injector injection epinephrine 0.3 mg/0.3 mL injection, auto-injector   More than a month       Allergies:  Diphenhydramine; Fexofenadine-pseudoephed er; Fish oil; Iodine; Liothyronine; Mechlorethamine; Mustard seed; Allyl isothiocyanate; Antihistamines, chlorpheniramine-type; Antihistamines, diphenhydramine-type; Codeine; Grape (artificial) flavor; and Latex    ROS:    Pertinent items are noted in HPI     Objective     Blood pressure 122/86, pulse 64, temperature 98 °F (36.7 °C), temperature source Oral, resp. rate 16, height 167.6 cm (66\"), weight 63.5 kg (139 lb 14.4 oz), SpO2 97%, not currently breastfeeding.    Physical Exam   Constitutional: Pt is oriented to person, " place, and time and well-developed, well-nourished, and in no distress.   Mouth/Throat: Oropharynx is clear and moist.   Neck: Normal range of motion.   Cardiovascular: Normal rate, regular rhythm    Pulmonary/Chest: Effort normal    Abdominal: Soft. Nontender  Skin: Skin is warm and dry.   Psychiatric: Mood, memory, affect and judgment normal.     Assessment & Plan     Diagnosis:  Rectal bleeding    Anticipated Surgical Procedure:  colonoscopy    The risks, benefits, and alternatives of this procedure have been discussed with the patient or the responsible party- the patient understands and agrees to proceed.

## 2024-01-09 NOTE — ANESTHESIA POSTPROCEDURE EVALUATION
Patient: Brooklynn Perez    Procedure Summary       Date: 01/09/24 Room / Location:  [facility] /  [facility]    Anesthesia Start: 1230 Anesthesia Stop: 1302    Procedure: COLONOSCOPY with polypectomy (cold bx and snare) Diagnosis:       Rectal bleeding      (Rectal bleeding [K62.5])    Surgeons: Macy Jolly MD Provider: Praful Rodney MD    Anesthesia Type: MAC ASA Status: 3            Anesthesia Type: MAC    Vitals  Vitals Value Taken Time   /122 01/09/24 1325   Temp     Pulse 68 01/09/24 1325   Resp 20 01/09/24 1325   SpO2 96 % 01/09/24 1325           Post Anesthesia Care and Evaluation    Patient location during evaluation: PACU  Patient participation: complete - patient participated  Level of consciousness: awake and alert  Pain management: adequate    Airway patency: patent  Anesthetic complications: No anesthetic complications    Cardiovascular status: acceptable  Respiratory status: acceptable  Hydration status: acceptable    Comments: ---------------------            01/09/24                1325      ---------------------   BP:      142/98    Pulse:      68        Resp:       20        Temp:                 SpO2:       96%      ---------------------

## 2024-01-09 NOTE — DISCHARGE INSTRUCTIONS
For the next 24 hours patient needs to be with a responsible adult.    For 24 hours DO NOT drive, operate machinery, appliances, drink alcohol, make important decisions or sign legal documents.    Start with a light or bland diet if you are feeling sick to your stomach otherwise advance to regular diet as tolerated.    Follow recommendations on procedure report if provided by your doctor.    Call Dr Jolly for problems [phone]    Problems may include but not limited to: large amounts of bleeding, trouble breathing, repeated vomiting, severe unrelieved pain, fever or chills.

## 2024-01-10 LAB
LAB AP CASE REPORT: NORMAL
PATH REPORT.FINAL DX SPEC: NORMAL
PATH REPORT.GROSS SPEC: NORMAL

## 2024-01-11 ENCOUNTER — TELEPHONE (OUTPATIENT)
Dept: GASTROENTEROLOGY | Facility: CLINIC | Age: 44
End: 2024-01-11
Payer: COMMERCIAL

## 2024-01-11 NOTE — PROGRESS NOTES
1 of The polyp(s) biopsies showed adenomatous change. This is not cancerous but is considered potentially precancerous. Follow-up colonoscopy in 5 years is advised.

## 2024-01-11 NOTE — TELEPHONE ENCOUNTER
Hub staff attempted to follow warm transfer process and was unsuccessful     Caller: Ana, Da'shame T    Relationship to patient: Self    Best call back number: [phone]    Patient is needing: HAS BEEN SICK SINCE SCOPE ON 01/09/24, AND IS NOT ABLE TO KEEP ANYTHING DOWN. NAUSEA/VOMITING WENT TO THE ER LAST NIGHT AND WAS GIVEN IBS AND NAUSEA MEDICINE AND SHE HAS BEEN VOMITING THIS UP AS WELL. SHE STATES SHE HAD SOME SPICY CHICKEN AFTER SCOPE AND HAS NOT BEEN RIGHT SINCE. PLEASE REVIEW AND CONTACT PATIENT TO ADVISE.

## 2024-01-11 NOTE — TELEPHONE ENCOUNTER
Recommend liquid diet until n/v subside, then bland solids - continue antinausea meds as needed.  If she is unable to stay hydrated needs to return to er for IVF

## 2024-01-11 NOTE — TELEPHONE ENCOUNTER
Called pt, she states after her colonoscopy on Tues she ate some spicy chicken and has had n/v since then.  Pt states she has drank water and ginger ale and vomited them. She went to  ER and rec'd IV fluids and phenergan w/o relief. She also had a CT that was negative. Pt is asking for recommendations.  Advised will send a msg to Dr Jolly.

## 2024-01-12 ENCOUNTER — TELEPHONE (OUTPATIENT)
Dept: GASTROENTEROLOGY | Facility: CLINIC | Age: 44
End: 2024-01-12
Payer: COMMERCIAL

## 2024-01-12 NOTE — TELEPHONE ENCOUNTER
Pt called earlier and left  for pt to call back.     C/s placed in [facility] and  for 01/09/2029.    Pt has 2 active calls.

## 2024-01-12 NOTE — TELEPHONE ENCOUNTER
Called pt and advised of Dr Jolly's note.  Pt verbalized understanding.     Pt states she is out of nausea medication and will try to go back to the ER.

## 2024-01-12 NOTE — TELEPHONE ENCOUNTER
----- Message from Macy Jolly MD sent at 1/11/2024  2:28 PM EST -----  1 of The polyp(s) biopsies showed adenomatous change. This is not cancerous but is considered potentially precancerous. Follow-up colonoscopy in 5 years is advised.

## 2024-02-15 RX ORDER — FAMOTIDINE 40 MG/1
40 TABLET, FILM COATED ORAL DAILY
Qty: 30 TABLET | Refills: 2 | Status: SHIPPED | OUTPATIENT
Start: 2024-02-15

## 2024-04-17 ENCOUNTER — TELEPHONE (OUTPATIENT)
Dept: GASTROENTEROLOGY | Facility: CLINIC | Age: 44
End: 2024-04-17

## 2024-04-17 RX ORDER — FAMOTIDINE 40 MG/1
40 TABLET, FILM COATED ORAL DAILY
Qty: 90 TABLET | Refills: 2 | Status: SHIPPED | OUTPATIENT
Start: 2024-04-17

## 2024-04-17 NOTE — TELEPHONE ENCOUNTER
Caller: Ana, Jose'shame T    Relationship: Self    Best call back number: [phone]     What was the call regarding: PT'S PHARMACY UNEXPECTEDLY CLOSED YESTERDAY AND SHE IS NEEDING HER MEDS TO BE SENT TO   [id number] [address] AT [facility] - [phone]  - [phone]   PLEASE SEND THOSE OVER. PT REQUESTS A CALL BACK ONCE THEY HAVE BEEN SENT.

## 2024-04-17 NOTE — TELEPHONE ENCOUNTER
Called pt and advised that Sonia WHITE sent her pepcid this am to the [facility] that she requested. Verb understanding.

## 2024-05-17 NOTE — TELEPHONE ENCOUNTER
Pt is requesting return phone call, please advise.    Detail Level: Detailed Render Path Notes In Note?: No Lab: [id number] Lab Facility: [id number] Billing Type: Third-Party Bill

## 2024-06-20 RX ORDER — POLYETHYLENE GLYCOL 3350 17 G/17G
POWDER, FOR SOLUTION ORAL
Qty: 510 G | Refills: 2 | Status: SHIPPED | OUTPATIENT
Start: 2024-06-20

## 2024-08-29 ENCOUNTER — OFFICE VISIT (OUTPATIENT)
Dept: GASTROENTEROLOGY | Facility: CLINIC | Age: 44
End: 2024-08-29
Payer: COMMERCIAL

## 2024-08-29 VITALS
TEMPERATURE: 98.8 F | SYSTOLIC BLOOD PRESSURE: 134 MMHG | HEIGHT: 66 IN | DIASTOLIC BLOOD PRESSURE: 86 MMHG | OXYGEN SATURATION: 98 % | BODY MASS INDEX: 20.12 KG/M2 | WEIGHT: 125.2 LBS | HEART RATE: 82 BPM

## 2024-08-29 DIAGNOSIS — R11.2 NAUSEA AND VOMITING, UNSPECIFIED VOMITING TYPE: Primary | ICD-10-CM

## 2024-08-29 DIAGNOSIS — R63.4 UNINTENTIONAL WEIGHT LOSS: ICD-10-CM

## 2024-08-29 PROCEDURE — 99214 OFFICE O/P EST MOD 30 MIN: CPT

## 2024-08-29 PROCEDURE — 1159F MED LIST DOCD IN RCRD: CPT

## 2024-08-29 PROCEDURE — 1160F RVW MEDS BY RX/DR IN RCRD: CPT

## 2024-08-29 RX ORDER — PANTOPRAZOLE SODIUM 40 MG/1
40 TABLET, DELAYED RELEASE ORAL
Qty: 180 TABLET | Refills: 0 | Status: SHIPPED | OUTPATIENT
Start: 2024-08-29

## 2024-08-29 NOTE — PROGRESS NOTES
"Chief Complaint  Nausea, Abdominal Pain, Constipation, and Vomiting    Subjective          History of Present Illness    Brooklynn Perez is a  44 y.o. female presents for follow-up for GERD and IBS.  She is a patient of Dr. Jolly.     Today, she reports ongoing constipation, nausea, occasional vomiting since January.  She also has flares in her hemorrhoids and they occasionally bleed.  She was scheduled for hemorrhoidectomy but had to cancel due to family matters.  She states that she is nauseous nearly every day.  She vomits weekly.  She denies hematemesis or coffee-ground emesis.  She does have a good appetite but just does not feel like eating due to nausea.  She has a little bit of acid reflux but does not feel that it is severe.  She has been losing weight and has lost about 20 pounds over the last 5 months unintentionally.  She denies difficulty swallowing.  She has still been struggling with constipation.  She tries take MiraLAX but still only has 1 bowel movement every 2 to 3 days.      1/9/2024 colonoscopy showed 15 polyps throughout the colon.  Diverticulosis, nonbleeding internal hemorrhoids. 1 polyp showed adenomatous change. F/u colonoscopy in 5 years.     Objective   Vital Signs:   /86 (BP Location: Left arm, Patient Position: Sitting)   Pulse 82   Temp 98.8 °F (37.1 °C)   Ht 167.6 cm (65.98\")   Wt 56.8 kg (125 lb 3.2 oz)   SpO2 98%   BMI 20.22 kg/m²       Physical Exam  Constitutional:       General: She is not in acute distress.     Appearance: Normal appearance.   Eyes:      General: No scleral icterus.  Cardiovascular:      Rate and Rhythm: Normal rate.   Pulmonary:      Effort: Pulmonary effort is normal.   Abdominal:      General: Abdomen is flat. Bowel sounds are normal. There is no distension.      Tenderness: There is no abdominal tenderness (Mild generalized tenderness to palpation). There is no guarding.   Skin:     Coloration: Skin is not jaundiced.   Neurological:      " General: No focal deficit present.      Mental Status: She is alert and oriented to person, place, and time.   Psychiatric:         Mood and Affect: Mood normal.         Behavior: Behavior normal.          Result Review :   The following data was reviewed by: Sonia Farrell PA-C on 08/29/2024:    CBC          11/21/2023    14:45 2/21/2024    14:22 7/8/2024    06:45   CBC   WBC 10.68     7.54        RBC 5.01     4.55        Hemoglobin 15.9     14.9     15.7       Hematocrit 46.7     43.6     46.1       MCV 93.2     95.8        MCH 31.7     32.7        MCHC 34.0     34.2        RDW 13.6     13.8        Platelets 354     328           Details          This result is from an external source.                     Assessment:   Diagnoses and all orders for this visit:    1. Nausea and vomiting, unspecified vomiting type (Primary)  -     Case Request; Standing  -     Case Request  -     pantoprazole (PROTONIX) 40 MG EC tablet; Take 1 tablet by mouth 2 (Two) Times a Day Before Meals.  Dispense: 180 tablet; Refill: 0    2. Unintentional weight loss  -     Case Request; Standing  -     Case Request  -     pantoprazole (PROTONIX) 40 MG EC tablet; Take 1 tablet by mouth 2 (Two) Times a Day Before Meals.  Dispense: 180 tablet; Refill: 0    Other orders  -     Implement Anesthesia orders day of procedure.; Standing  -     Follow Anesthesia Guidelines / Protocol; Future          Plan:   -Regarding nausea-will plan for EGD for further evaluation especially given significant weight loss  -Advise she stop famotidine and will start on pantoprazole twice daily  -Patient provided with samples of Linzess 72 mcg.  Will reach out to pharmacy and see if her insurance will cover either Amitiza or Linzess  -Advised patient reach back out to colorectal regarding her hemorrhoids      Follow Up   Return in about 4 weeks (around 9/26/2024).    Dragon dictation used throughout this note.         Sonia Farrell PA-C  [facility]  [facility] [place] [address]  Office: [phone]

## 2024-08-30 ENCOUNTER — TELEPHONE (OUTPATIENT)
Dept: GASTROENTEROLOGY | Facility: CLINIC | Age: 44
End: 2024-08-30
Payer: COMMERCIAL

## 2024-08-30 PROBLEM — R11.2 NAUSEA AND VOMITING: Status: ACTIVE | Noted: 2024-08-29

## 2024-08-30 PROBLEM — R63.4 UNINTENTIONAL WEIGHT LOSS: Status: ACTIVE | Noted: 2024-08-29

## 2024-08-30 NOTE — TELEPHONE ENCOUNTER
EMAILED ALEX SCHEDULED EGD FOR 9-5-24 ARRIVAL TIME 830AM, EMAILED PREP INSTRUCTIONS TO VERIFIED EMAIL ADDRESS ON FILE

## 2024-09-04 ENCOUNTER — TELEPHONE (OUTPATIENT)
Dept: GASTROENTEROLOGY | Facility: CLINIC | Age: 44
End: 2024-09-04
Payer: COMMERCIAL

## 2024-09-04 NOTE — TELEPHONE ENCOUNTER
Hub staff attempted to follow warm transfer process and was unsuccessful     Caller: Love, Da'shame T    Relationship to patient: Self    Best call back number: [phone]    Patient is needing: PT IS CALLING BECAUSE SHE HAS A PROCEDURE NEXT WEEK AS WELL. SHE DOES NOT NEED TO GO UNDER BACK TO BACK. PT IS WANTING TO CANCEL THIS PROCEDURE AND RESCHEDULE. PT ALSO WANTS TO TALK WITH SOMEONE ABOUT THE EGD. SHE THOUGHT SHE WAS GETTING AN ULTRASOUND AND NOT EGD. PLEASE GIVE PT A CALL BACK ASAP. IF NOT ABLE TO REACH PT. IT IS OKAY TO LVM.

## 2024-10-02 ENCOUNTER — TELEPHONE (OUTPATIENT)
Dept: GASTROENTEROLOGY | Facility: CLINIC | Age: 44
End: 2024-10-02

## 2024-10-02 DIAGNOSIS — K62.5 RECTAL BLEEDING: ICD-10-CM

## 2024-10-02 DIAGNOSIS — K64.3 GRADE IV HEMORRHOIDS: ICD-10-CM

## 2024-10-02 RX ORDER — HYDROCORTISONE 2.5 %
1 CREAM (GRAM) TOPICAL 2 TIMES DAILY
Qty: 30 G | Refills: 1 | Status: SHIPPED | OUTPATIENT
Start: 2024-10-02

## 2024-10-02 NOTE — TELEPHONE ENCOUNTER
Caller: AnaBrooklynn T    Relationship: Self    Best call back number: [phone]    Requested Prescriptions:   Requested Prescriptions     Pending Prescriptions Disp Refills    hydrocortisone 2.5 % cream 30 g 1     Sig: Apply 1 Application topically to the appropriate area as directed 2 (Two) Times a Day.        Pharmacy where request should be sent:  [id number]    Last office visit with prescribing clinician: 8/29/2024   Last telemedicine visit with prescribing clinician: Visit date not found   Next office visit with prescribing clinician: 10/10/2024     Additional details provided by patient: PT NEEDS A NEW PRESCRIPTION     Does the patient have less than a 3 day supply:  [x] Yes  [] No    Would you like a call back once the refill request has been completed: [x] Yes [] No    If the office needs to give you a call back, can they leave a voicemail: [x] Yes [] No    Nisreen Biggs Rep   10/02/24 10:26 EDT

## 2024-10-02 NOTE — TELEPHONE ENCOUNTER
Called pt and pt is ready to schedule her egd.  She state her kids are off and would like to get scheduled this week.  Advised pt that this will most likely not happen.  Advised will send message to scheduling.  Verb understanding.

## 2024-10-02 NOTE — TELEPHONE ENCOUNTER
Caller: Love, Da'shame T    Relationship: Self    Best call back number: [phone]    What is the best time to reach you: ANYTIME     Who are you requesting to speak with (clinical staff, provider,  specific staff member): CLINICAL STAFF         What was the call regarding: PT IS NEEDING SPEAK WITH SOMEONE ABOUT PROCEDURE SHE'S SUPPOSED TO BE GETTING. PT GETTING EPIDURALS AND ABLATIONS  EVERY 3 MONTHS AND SHE DOESN'T WANT IT INTERFERING WITH HER PROCEDURE. PT IS WANTING MEDICAL ADVICE. PLEASE CALL AND ADVISE

## 2024-10-04 ENCOUNTER — TELEPHONE (OUTPATIENT)
Dept: GASTROENTEROLOGY | Facility: CLINIC | Age: 44
End: 2024-10-04
Payer: COMMERCIAL

## 2024-10-04 NOTE — TELEPHONE ENCOUNTER
Caller: Love, Da'shame T    Relationship: Self    Best call back number: [phone]    What is the best time to reach you:     Who are you requesting to speak with (clinical staff, provider,  specific staff member): SCHEDULING    Do you know the name of the person who called: SIAM    What was the call regarding: SCHEDULING A SCOPE    Is it okay if the provider responds through MyChart:

## 2024-10-15 ENCOUNTER — OFFICE VISIT (OUTPATIENT)
Dept: GASTROENTEROLOGY | Facility: CLINIC | Age: 44
End: 2024-10-15
Payer: COMMERCIAL

## 2024-10-15 VITALS
BODY MASS INDEX: 20.22 KG/M2 | OXYGEN SATURATION: 96 % | SYSTOLIC BLOOD PRESSURE: 142 MMHG | DIASTOLIC BLOOD PRESSURE: 102 MMHG | HEART RATE: 73 BPM | HEIGHT: 66 IN

## 2024-10-15 DIAGNOSIS — K58.1 IRRITABLE BOWEL SYNDROME WITH CONSTIPATION: Primary | ICD-10-CM

## 2024-10-15 DIAGNOSIS — R63.4 UNINTENTIONAL WEIGHT LOSS: ICD-10-CM

## 2024-10-15 DIAGNOSIS — R11.2 NAUSEA AND VOMITING, UNSPECIFIED VOMITING TYPE: ICD-10-CM

## 2024-10-15 RX ORDER — PROMETHAZINE HYDROCHLORIDE 6.25 MG/5ML
5 SYRUP ORAL EVERY 4 HOURS PRN
COMMUNITY
Start: 2024-09-17

## 2024-10-15 RX ORDER — PANTOPRAZOLE SODIUM 40 MG/1
40 TABLET, DELAYED RELEASE ORAL
Qty: 180 TABLET | Refills: 0 | Status: SHIPPED | OUTPATIENT
Start: 2024-10-15

## 2024-10-15 NOTE — PROGRESS NOTES
"Chief Complaint  Nausea    Subjective          History of Present Illness    Brooklynn Perez is a  44 y.o. female presents for follow-up for GERD and IBS.  She is a patient of Dr. Jolly.    At last visit she was having ongoing constipation, nausea and occasional vomiting since January.  She had also reported occasional flares in her hemorrhoids.  She was reporting nearly daily bouts of nausea and it was recommended she undergo an EGD but this has not been scheduled.  She is provided with samples of Linzess 72 and was started on pantoprazole twice daily.    Patient reports he tried Linzess for a few days after her appointment and did find it to be quite helpful but then reports that she lost her samples and was unable to continue on the medication.  She also reports that she is only been taking famotidine and not pantoprazole.  She is still having quite a bit of nausea and occasional vomiting.  She continues to be constipated as well.  She reports that she does have a good appetite and is able to take in quite a bit of food despite her nausea.    1/9/2024 colonoscopy showed 15 polyps throughout the colon. Diverticulosis, nonbleeding internal hemorrhoids. 1 polyp showed adenomatous change. F/u colonoscopy in 5 years.     Objective   Vital Signs:   BP (!) 142/102 (BP Location: Left arm, Patient Position: Sitting, Cuff Size: Adult)   Pulse 73   Ht 167.6 cm (65.98\")   SpO2 96%   BMI 20.22 kg/m²       Physical Exam  Constitutional:       General: She is not in acute distress.     Appearance: Normal appearance.   Eyes:      General: No scleral icterus.  Cardiovascular:      Rate and Rhythm: Normal rate.   Pulmonary:      Effort: Pulmonary effort is normal.   Abdominal:      General: Abdomen is flat. Bowel sounds are normal. There is no distension.      Tenderness: There is no abdominal tenderness. There is no guarding.   Skin:     Coloration: Skin is not jaundiced.   Neurological:      General: No focal deficit " present.      Mental Status: She is alert and oriented to person, place, and time.   Psychiatric:         Mood and Affect: Mood normal.         Behavior: Behavior normal.          Result Review :   The following data was reviewed by: Sonia Farrell PA-C on 10/15/2024:    CBC          7/8/2024    06:45 9/12/2024    13:19 9/17/2024    11:12   CBC   WBC  10.77     11.02       RBC  4.53     4.65       Hemoglobin 15.7     14.9     15.2       Hematocrit 46.1     43.6     44.3       MCV  96.2     95.3       MCH  32.9     32.7       MCHC  34.2     34.3       RDW  14.6     13.8       Platelets  327     357          Details          This result is from an external source.                     Assessment:   Diagnoses and all orders for this visit:    1. Irritable bowel syndrome with constipation (Primary)  -     linaclotide (Linzess) 72 MCG capsule capsule; Take 1 capsule by mouth Every Morning Before Breakfast.  Dispense: 90 capsule; Refill: 3    2. Nausea and vomiting, unspecified vomiting type  -     pantoprazole (PROTONIX) 40 MG EC tablet; Take 1 tablet by mouth 2 (Two) Times a Day Before Meals.  Dispense: 180 tablet; Refill: 0    3. Unintentional weight loss  -     pantoprazole (PROTONIX) 40 MG EC tablet; Take 1 tablet by mouth 2 (Two) Times a Day Before Meals.  Dispense: 180 tablet; Refill: 0          Plan:   -Will send prescription for Linzess  -Advise she continue on pantoprazole given ongoing nausea and vomiting  -Patient would like to proceed with EGD.  Will get this scheduled today.  Depending on results would consider gastric emptying study.      Follow Up   No follow-ups on file.    Dragon dictation used throughout this note.         Sonia Farrell PA-C  [facility]  [address] [place] [address] - [address] [place] [address]  Office: [phone]

## 2025-01-02 ENCOUNTER — TELEPHONE (OUTPATIENT)
Dept: GASTROENTEROLOGY | Facility: CLINIC | Age: 45
End: 2025-01-02
Payer: COMMERCIAL

## 2025-01-03 RX ORDER — METOCLOPRAMIDE 5 MG/1
1 TABLET ORAL EVERY 6 HOURS
COMMUNITY
Start: 2024-12-12

## 2025-01-03 RX ORDER — PREDNISONE 20 MG/1
2 TABLET ORAL DAILY
COMMUNITY
Start: 2024-11-22

## 2025-01-03 RX ORDER — DEXTROMETHORPHAN HYDROBROMIDE AND PROMETHAZINE HYDROCHLORIDE 15; 6.25 MG/5ML; MG/5ML
SYRUP ORAL
COMMUNITY
Start: 2024-12-30

## 2025-01-03 RX ORDER — DICLOFENAC SODIUM 75 MG/1
1 TABLET, DELAYED RELEASE ORAL EVERY 12 HOURS SCHEDULED
COMMUNITY
Start: 2024-12-19

## 2025-01-03 RX ORDER — ONDANSETRON 4 MG/1
TABLET, ORALLY DISINTEGRATING ORAL
COMMUNITY
Start: 2024-12-09

## 2025-01-03 RX ORDER — ALBUTEROL SULFATE AND BUDESONIDE 90; 80 UG/1; UG/1
AEROSOL, METERED RESPIRATORY (INHALATION)
COMMUNITY
Start: 2024-12-21

## 2025-01-03 RX ORDER — PREDNISONE 10 MG/1
TABLET ORAL
COMMUNITY
Start: 2024-12-19

## 2025-01-03 RX ORDER — CHOLECALCIFEROL (VITAMIN D3) 50 MCG
1 TABLET ORAL DAILY
COMMUNITY
Start: 2024-12-30

## 2025-01-03 RX ORDER — FLUTICASONE PROPIONATE 50 MCG
2 SPRAY, SUSPENSION (ML) NASAL DAILY
COMMUNITY
Start: 2024-12-21

## 2025-01-03 RX ORDER — POTASSIUM CHLORIDE 750 MG/1
1 TABLET, EXTENDED RELEASE ORAL DAILY
COMMUNITY
Start: 2024-12-21

## 2025-01-06 ENCOUNTER — HOSPITAL ENCOUNTER (OUTPATIENT)
Facility: HOSPITAL | Age: 45
Setting detail: HOSPITAL OUTPATIENT SURGERY
Discharge: HOME | End: 2025-01-06
Attending: INTERNAL MEDICINE | Admitting: INTERNAL MEDICINE
Payer: COMMERCIAL

## 2025-01-06 ENCOUNTER — ANESTHESIA EVENT (OUTPATIENT)
Dept: GASTROENTEROLOGY | Facility: HOSPITAL | Age: 45
End: 2025-01-06
Payer: COMMERCIAL

## 2025-01-06 ENCOUNTER — ANESTHESIA (OUTPATIENT)
Dept: GASTROENTEROLOGY | Facility: HOSPITAL | Age: 45
End: 2025-01-06
Payer: COMMERCIAL

## 2025-01-06 VITALS
BODY MASS INDEX: 20.55 KG/M2 | DIASTOLIC BLOOD PRESSURE: 114 MMHG | WEIGHT: 127.9 LBS | RESPIRATION RATE: 16 BRPM | HEART RATE: 94 BPM | OXYGEN SATURATION: 96 % | HEIGHT: 66 IN | SYSTOLIC BLOOD PRESSURE: 151 MMHG

## 2025-01-06 DIAGNOSIS — R63.4 UNINTENTIONAL WEIGHT LOSS: ICD-10-CM

## 2025-01-06 DIAGNOSIS — R11.2 NAUSEA AND VOMITING, UNSPECIFIED VOMITING TYPE: ICD-10-CM

## 2025-01-06 LAB
ALBUMIN SERPL-MCNC: 4.5 G/DL (ref 3.5–5.2)
ALBUMIN/GLOB SERPL: 1.5 G/DL
ALP SERPL-CCNC: 65 U/L (ref 39–117)
ALT SERPL W P-5'-P-CCNC: 14 U/L (ref 1–33)
ANION GAP SERPL CALCULATED.3IONS-SCNC: 12.7 MMOL/L (ref 5–15)
AST SERPL-CCNC: 13 U/L (ref 1–32)
B-HCG UR QL: NEGATIVE
BASOPHILS # BLD AUTO: 0.02 10*3/MM3 (ref 0–0.2)
BASOPHILS NFR BLD AUTO: 0.2 % (ref 0–1.5)
BILIRUB SERPL-MCNC: 0.5 MG/DL (ref 0–1.2)
BUN SERPL-MCNC: 11 MG/DL (ref 6–20)
BUN/CREAT SERPL: 13.4 (ref 7–25)
CALCIUM SPEC-SCNC: 9.8 MG/DL (ref 8.6–10.5)
CHLORIDE SERPL-SCNC: 103 MMOL/L (ref 98–107)
CO2 SERPL-SCNC: 23.3 MMOL/L (ref 22–29)
CREAT SERPL-MCNC: 0.82 MG/DL (ref 0.57–1)
DEPRECATED RDW RBC AUTO: 49.9 FL (ref 37–54)
EGFRCR SERPLBLD CKD-EPI 2021: 91.7 ML/MIN/1.73
EOSINOPHIL # BLD AUTO: 0 10*3/MM3 (ref 0–0.4)
EOSINOPHIL NFR BLD AUTO: 0 % (ref 0.3–6.2)
ERYTHROCYTE [DISTWIDTH] IN BLOOD BY AUTOMATED COUNT: 13.6 % (ref 12.3–15.4)
EXPIRATION DATE: NORMAL
GLOBULIN UR ELPH-MCNC: 3.1 GM/DL
GLUCOSE SERPL-MCNC: 96 MG/DL (ref 65–99)
HCT VFR BLD AUTO: 42.7 % (ref 34–46.6)
HGB BLD-MCNC: 14 G/DL (ref 12–15.9)
IMM GRANULOCYTES # BLD AUTO: 0.03 10*3/MM3 (ref 0–0.05)
IMM GRANULOCYTES NFR BLD AUTO: 0.3 % (ref 0–0.5)
INTERNAL NEGATIVE CONTROL: NEGATIVE
INTERNAL POSITIVE CONTROL: POSITIVE
LIPASE SERPL-CCNC: 19 U/L (ref 13–60)
LYMPHOCYTES # BLD AUTO: 1.8 10*3/MM3 (ref 0.7–3.1)
LYMPHOCYTES NFR BLD AUTO: 16.1 % (ref 19.6–45.3)
Lab: NORMAL
MCH RBC QN AUTO: 32.6 PG (ref 26.6–33)
MCHC RBC AUTO-ENTMCNC: 32.8 G/DL (ref 31.5–35.7)
MCV RBC AUTO: 99.5 FL (ref 79–97)
MONOCYTES # BLD AUTO: 0.95 10*3/MM3 (ref 0.1–0.9)
MONOCYTES NFR BLD AUTO: 8.5 % (ref 5–12)
NEUTROPHILS NFR BLD AUTO: 74.9 % (ref 42.7–76)
NEUTROPHILS NFR BLD AUTO: 8.38 10*3/MM3 (ref 1.7–7)
NRBC BLD AUTO-RTO: 0 /100 WBC (ref 0–0.2)
PLATELET # BLD AUTO: 323 10*3/MM3 (ref 140–450)
PMV BLD AUTO: 9.3 FL (ref 6–12)
POTASSIUM SERPL-SCNC: 3.4 MMOL/L (ref 3.5–5.2)
PROT SERPL-MCNC: 7.6 G/DL (ref 6–8.5)
RBC # BLD AUTO: 4.29 10*6/MM3 (ref 3.77–5.28)
SODIUM SERPL-SCNC: 139 MMOL/L (ref 136–145)
TSH SERPL DL<=0.05 MIU/L-ACNC: 3.11 UIU/ML (ref 0.27–4.2)
WBC NRBC COR # BLD AUTO: 11.18 10*3/MM3 (ref 3.4–10.8)

## 2025-01-06 PROCEDURE — 80053 COMPREHEN METABOLIC PANEL: CPT | Performed by: INTERNAL MEDICINE

## 2025-01-06 PROCEDURE — 83690 ASSAY OF LIPASE: CPT | Performed by: INTERNAL MEDICINE

## 2025-01-06 PROCEDURE — 88305 TISSUE EXAM BY PATHOLOGIST: CPT | Performed by: INTERNAL MEDICINE

## 2025-01-06 PROCEDURE — 25010000002 PROPOFOL 200 MG/20ML EMULSION: Performed by: ANESTHESIOLOGY

## 2025-01-06 PROCEDURE — 84443 ASSAY THYROID STIM HORMONE: CPT | Performed by: INTERNAL MEDICINE

## 2025-01-06 PROCEDURE — 81025 URINE PREGNANCY TEST: CPT | Performed by: INTERNAL MEDICINE

## 2025-01-06 PROCEDURE — 85025 COMPLETE CBC W/AUTO DIFF WBC: CPT | Performed by: INTERNAL MEDICINE

## 2025-01-06 PROCEDURE — 43239 EGD BIOPSY SINGLE/MULTIPLE: CPT | Performed by: INTERNAL MEDICINE

## 2025-01-06 PROCEDURE — 25810000003 LACTATED RINGERS PER 1000 ML: Performed by: INTERNAL MEDICINE

## 2025-01-06 RX ORDER — PROPOFOL 10 MG/ML
INJECTION, EMULSION INTRAVENOUS CONTINUOUS PRN
Status: DISCONTINUED | OUTPATIENT
Start: 2025-01-06 | End: 2025-01-06 | Stop reason: SURG

## 2025-01-06 RX ORDER — SODIUM CHLORIDE, SODIUM LACTATE, POTASSIUM CHLORIDE, CALCIUM CHLORIDE 600; 310; 30; 20 MG/100ML; MG/100ML; MG/100ML; MG/100ML
30 INJECTION, SOLUTION INTRAVENOUS CONTINUOUS PRN
Status: DISCONTINUED | OUTPATIENT
Start: 2025-01-06 | End: 2025-01-06 | Stop reason: HOSPADM

## 2025-01-06 RX ADMIN — SODIUM CHLORIDE, POTASSIUM CHLORIDE, SODIUM LACTATE AND CALCIUM CHLORIDE 30 ML/HR: 600; 310; 30; 20 INJECTION, SOLUTION INTRAVENOUS at 12:30

## 2025-01-06 RX ADMIN — PROPOFOL 50 MCG/KG/MIN: 10 INJECTION, EMULSION INTRAVENOUS at 13:16

## 2025-01-06 NOTE — H&P
"Chief Complaint  Nausea, Abdominal Pain, Constipation, and Vomiting        Subjective  History of Present Illness     Brooklynn Perez is a  44 y.o. female presents for follow-up for GERD and IBS.  She is a patient of Dr. Jolly.      Today, she reports ongoing constipation, nausea, occasional vomiting since January.  She also has flares in her hemorrhoids and they occasionally bleed.  She was scheduled for hemorrhoidectomy but had to cancel due to family matters.  She states that she is nauseous nearly every day.  She vomits weekly.  She denies hematemesis or coffee-ground emesis.  She does have a good appetite but just does not feel like eating due to nausea.  She has a little bit of acid reflux but does not feel that it is severe.  She has been losing weight and has lost about 20 pounds over the last 5 months unintentionally.  She denies difficulty swallowing.  She has still been struggling with constipation.  She tries take MiraLAX but still only has 1 bowel movement every 2 to 3 days.       1/9/2024 colonoscopy showed 15 polyps throughout the colon.  Diverticulosis, nonbleeding internal hemorrhoids. 1 polyp showed adenomatous change. F/u colonoscopy in 5 years.         Objective  Vital Signs:   /86 (BP Location: Left arm, Patient Position: Sitting)   Pulse 82   Temp 98.8 °F (37.1 °C)   Ht 167.6 cm (65.98\")   Wt 56.8 kg (125 lb 3.2 oz)   SpO2 98%   BMI 20.22 kg/m²        Physical Exam  Constitutional:       General: She is not in acute distress.     Appearance: Normal appearance.   Eyes:      General: No scleral icterus.  Cardiovascular:      Rate and Rhythm: Normal rate.   Pulmonary:      Effort: Pulmonary effort is normal.   Abdominal:      General: Abdomen is flat. Bowel sounds are normal. There is no distension.      Tenderness: There is no abdominal tenderness (Mild generalized tenderness to palpation). There is no guarding.   Skin:     Coloration: Skin is not jaundiced.   Neurological:      " General: No focal deficit present.      Mental Status: She is alert and oriented to person, place, and time.   Psychiatric:         Mood and Affect: Mood normal.         Behavior: Behavior normal.               Result Review  :   The following data was reviewed by: Sonia Farrell PA-C on 08/29/2024:  CMP Results:         CBC Results   CBC            11/21/2023    14:45 2/21/2024    14:22 7/8/2024    06:45   CBC   WBC 10.68     7.54         RBC 5.01     4.55         Hemoglobin 15.9     14.9     15.7       Hematocrit 46.7     43.6     46.1       MCV 93.2     95.8         MCH 31.7     32.7         MCHC 34.0     34.2         RDW 13.6     13.8         Platelets 354     328              Details           This result is from an external source.                             Assessment:   Diagnoses and all orders for this visit:     1. Nausea and vomiting, unspecified vomiting type (Primary)  -     Case Request; Standing  -     Case Request  -     pantoprazole (PROTONIX) 40 MG EC tablet; Take 1 tablet by mouth 2 (Two) Times a Day Before Meals.  Dispense: 180 tablet; Refill: 0     2. Unintentional weight loss  -     Case Request; Standing  -     Case Request  -     pantoprazole (PROTONIX) 40 MG EC tablet; Take 1 tablet by mouth 2 (Two) Times a Day Before Meals.  Dispense: 180 tablet; Refill: 0     Other orders  -     Implement Anesthesia orders day of procedure.; Standing  -     Follow Anesthesia Guidelines / Protocol; Future              Plan:   -Regarding nausea-will plan for EGD for further evaluation especially given significant weight loss  -Advise she stop famotidine and will start on pantoprazole twice daily  -Patient provided with samples of Linzess 72 mcg.  Will reach out to pharmacy and see if her insurance will cover either Amitiza or Linzess  -Advised patient reach back out to colorectal regarding her hemorrhoids        Follow Up   Return in about 4 weeks (around 9/26/2024).     1/6/25 - No change from the above  Gold Prado M.D.

## 2025-01-06 NOTE — ANESTHESIA POSTPROCEDURE EVALUATION
Patient: Belem Perez    Procedure Summary       Date: 01/06/25 Room / Location:  [facility] / [facility]COPY    Anesthesia Start: 1313 Anesthesia Stop: 1333    Procedure: ESOPHAGOGASTRODUODENOSCOPY with biopsies (Esophagus) Diagnosis:       Nausea and vomiting, unspecified vomiting type      Unintentional weight loss      (Nausea and vomiting, unspecified vomiting type [R11.2])      (Unintentional weight loss [R63.4])    Surgeons: Jesus Prado MD Provider: Nadeem Hinson MD    Anesthesia Type: MAC ASA Status: 3            Anesthesia Type: MAC    Vitals  Vitals Value Taken Time   /86 01/06/25 1329   Temp     Pulse 109 01/06/25 1333   Resp     SpO2 99 % 01/06/25 1333   Vitals shown include unfiled device data.        Post Anesthesia Care and Evaluation    Patient location during evaluation: PACU  Patient participation: complete - patient participated  Level of consciousness: awake and alert  Pain management: adequate    Airway patency: patent  Anesthetic complications: No anesthetic complications    Cardiovascular status: acceptable  Respiratory status: acceptable  Hydration status: acceptable    Comments: --------------------            01/06/25               1226     --------------------   BP:       137/94     Pulse:      89       Resp:       20       SpO2:      99%      --------------------

## 2025-01-06 NOTE — DISCHARGE INSTRUCTIONS
For the next 24 hours patient needs to be with a responsible adult.    For 24 hours DO NOT drive, operate machinery, appliances, drink alcohol, make important decisions or sign legal documents.    Start with a light or bland diet if you are feeling sick to your stomach otherwise advance to regular diet as tolerated.    Follow recommendations on procedure report if provided by your doctor.    Call Dr Prado for problems [phone]    Problems may include but not limited to: large amounts of bleeding, trouble breathing, repeated vomiting, severe unrelieved pain, fever or chills.

## 2025-01-06 NOTE — ANESTHESIA PREPROCEDURE EVALUATION
Anesthesia Evaluation     Patient summary reviewed and Nursing notes reviewed   history of anesthetic complications:  PONV               Airway   Mallampati: II  TM distance: >3 FB  Dental      Pulmonary    (+) a smoker Former, asthma,  Cardiovascular     Rhythm: regular  Rate: normal    (+) hypertension, hyperlipidemia      Neuro/Psych  (+) psychiatric history Bipolar  GI/Hepatic/Renal/Endo    (+) GERD, GI bleeding , thyroid problem hypothyroidism    Musculoskeletal (-) negative ROS    Abdominal    Substance History   (+) alcohol use     OB/GYN negative ob/gyn ROS         Other                      Anesthesia Plan    ASA 3     MAC     intravenous induction     Anesthetic plan, risks, benefits, and alternatives have been provided, discussed and informed consent has been obtained with: patient.    CODE STATUS:

## 2025-01-07 LAB
CYTO UR: NORMAL
LAB AP CASE REPORT: NORMAL
PATH REPORT.FINAL DX SPEC: NORMAL
PATH REPORT.GROSS SPEC: NORMAL

## 2025-01-07 NOTE — PROGRESS NOTES
01/07/25       Clinical Pool #1:  Tell her that her lab work (done on 1/6/2025) showed that her potassium was minimally low at 3.4.  Her white count is mildly elevated at 11.18.  Her other labs look good.  I would recommend that she see her PCP to have her potassium and white count rechecked.       Pathology from her EGD showed normal duodenal biopsies.  The stomach biopsy showed mild inflammation of the stomach but no evidence of Helicobacter pylori.       Please send a copy of this report to her PCP.       Eric Farrell and Dr. Macy Jolly: FYI.   Thx. kjh

## 2025-01-08 ENCOUNTER — TELEPHONE (OUTPATIENT)
Dept: GASTROENTEROLOGY | Facility: CLINIC | Age: 45
End: 2025-01-08
Payer: COMMERCIAL

## 2025-01-08 DIAGNOSIS — R11.2 NAUSEA AND VOMITING, UNSPECIFIED VOMITING TYPE: ICD-10-CM

## 2025-01-08 DIAGNOSIS — R63.4 UNINTENTIONAL WEIGHT LOSS: ICD-10-CM

## 2025-01-08 RX ORDER — PANTOPRAZOLE SODIUM 40 MG/1
40 TABLET, DELAYED RELEASE ORAL
Qty: 180 TABLET | Refills: 0 | Status: SHIPPED | OUTPATIENT
Start: 2025-01-08

## 2025-01-08 NOTE — TELEPHONE ENCOUNTER
Called pt and advised of Dr Prado's note. Verb understanding.     Pt reports that she goes to a clinic and will have them recheck her labs.

## 2025-01-08 NOTE — TELEPHONE ENCOUNTER
----- Message from Jesus Prado sent at 1/7/2025  3:26 PM EST -----  01/07/25       Clinical Pool #1:  Tell her that her lab work (done on 1/6/2025) showed that her potassium was minimally low at 3.4.  Her white count is mildly elevated at 11.18.  Her other labs look good.  I would recommend that she see her PCP to have her potassium and white count rechecked.       Pathology from her EGD showed normal duodenal biopsies.  The stomach biopsy showed mild inflammation of the stomach but no evidence of Helicobacter pylori.       Please send a copy of this report to her PCP.       Eric Farrell and Dr. Macy Jolly: FYI.   Thx. kjh

## 2025-01-08 NOTE — TELEPHONE ENCOUNTER
Pantoprazole refilled.  Recommend that she stop Linzess if that is causing diarrhea and resume MiraLAX daily for her constipation

## 2025-01-08 NOTE — TELEPHONE ENCOUNTER
Hub staff attempted to follow warm transfer process and was unsuccessful     Caller: Belem Perez    Relationship to patient: Self    Best call back number: [phone]    Patient is needing: PT IS RETURNING PHONE CALL FROM ERIN COREA RN. PLEASE CALL PT BACK

## 2025-01-08 NOTE — TELEPHONE ENCOUNTER
Hub staff attempted to follow warm transfer process and was unsuccessful     Caller: Belem Perez    Relationship to patient: Self    Best call back number: [phone]      Patient is needing: PT RETURNING PHONE CALL, PLEASE CALL BACK TO DISCUSS ABOUT MEDICATION AS WELL.

## 2025-01-08 NOTE — TELEPHONE ENCOUNTER
Caller: Belem Perez    Relationship: Self    Best call back number: [phone]     Requested Prescriptions:   Requested Prescriptions     Pending Prescriptions Disp Refills    pantoprazole (PROTONIX) 40 MG EC tablet 180 tablet 0     Sig: Take 1 tablet by mouth 2 (Two) Times a Day Before Meals.        Pharmacy where request should be sent:  [address], [place] [address] [facility] [phone] [facility] [phone]      Last office visit with prescribing clinician: Visit date not found   Last telemedicine visit with prescribing clinician: Visit date not found   Next office visit with prescribing clinician: Visit date not found     Additional details provided by patient: PATIENT HAS NOT BEEN TAKING HER LINZESS.  IT DOES NOT WORK AND GIVES HER DIARRHEA. SHE WOULD LIKE TO GO BACK TO TRYING PANTOPRAZOLE.     Does the patient have less than a 3 day supply:  [x] Yes  [] No    Would you like a call back once the refill request has been completed: [] Yes [x] No    If the office needs to give you a call back, can they leave a voicemail: [] Yes [x] No    Nisreen Flanagan Rep   01/08/25 10:28 EST

## 2025-01-08 NOTE — TELEPHONE ENCOUNTER
Hub staff attempted to follow warm transfer process and was unsuccessful     Caller: Belem Perez    Relationship to patient: Self    Best call back number: [phone]    Patient is needing: RETURNING CALL FROM CATHERINE KHAN.  PLEASE CALL BACK.

## 2025-01-10 ENCOUNTER — TELEPHONE (OUTPATIENT)
Dept: GASTROENTEROLOGY | Facility: CLINIC | Age: 45
End: 2025-01-10
Payer: COMMERCIAL

## 2025-01-10 NOTE — TELEPHONE ENCOUNTER
Hub staff attempted to follow warm transfer process and was unsuccessful       Caller: Belem Perez    Relationship to patient: Self      Best call back number: [phone]    Provider: DR MCCLELLAN    Medication PA needed: PANTOPRAZOLE 2X/DAY    Reason for call/Prior Auth: PRESCRIPTION ONLY WRITTEN FOR 1X DAY. [employer] WILL NOT PAY FOR TWICE DAILY W/OUT PA. PT WANTS TO KNOW IF SHE SHOULD TAKE SOMETHING ELSE SINCE SHE WON'T BE ABLE TO GET BACK OUT TO GET SCRIPT TODAY. PLEASE CONTACT PT TO ADVISE. URGENT

## 2025-01-10 NOTE — TELEPHONE ENCOUNTER
Called pt and advised will get a PA for pantoprazole BID.  Advised pt to continue to take omeprazole until PA approved. Pt verbalized understanding.

## 2025-01-24 ENCOUNTER — TELEPHONE (OUTPATIENT)
Dept: GASTROENTEROLOGY | Facility: CLINIC | Age: 45
End: 2025-01-24
Payer: COMMERCIAL

## 2025-01-24 DIAGNOSIS — K64.3 GRADE IV HEMORRHOIDS: ICD-10-CM

## 2025-01-24 DIAGNOSIS — K62.5 RECTAL BLEEDING: ICD-10-CM

## 2025-01-24 RX ORDER — HYDROCORTISONE 25 MG/G
1 CREAM TOPICAL 2 TIMES DAILY
Qty: 30 G | Refills: 1 | Status: SHIPPED | OUTPATIENT
Start: 2025-01-24

## 2025-01-24 NOTE — TELEPHONE ENCOUNTER
Caller: Belem Perez    Relationship to patient: Self    Best call back number: [phone]     Patient is needing: PATIENT HAD AN EGD PERFORMED ON 1/6/25 AND WOULD LIKE FOR SOMEONE TO CALL HER AND GO OVER THOSE RESULTS.  PATIENT DOES NOT HAVE ACCESS TO [employer].  PLEASE CALL BACK TO ADVISE.

## 2025-01-24 NOTE — TELEPHONE ENCOUNTER
Caller: Belem Perez    Relationship: Self    Best call back number: [phone]     Requested Prescriptions:   Requested Prescriptions     Pending Prescriptions Disp Refills    hydrocortisone 2.5 % cream 30 g 1     Sig: Apply 1 Application topically to the appropriate area as directed 2 (Two) Times a Day.        Pharmacy where request should be sent:  [facility] [address] [place] [address] ([facility]), [place] [address] [address] [facility] [phone] [facility] [phone]      Last office visit with prescribing clinician: Visit date not found   Last telemedicine visit with prescribing clinician: Visit date not found   Next office visit with prescribing clinician: Visit date not found     Additional details provided by patient: PATIENT IS COMPLETELY OUT.    Does the patient have less than a 3 day supply:  [x] Yes  [] No    Would you like a call back once the refill request has been completed: [] Yes [x] No    If the office needs to give you a call back, can they leave a voicemail: [] Yes [x] No    Nisreen Flanagan Rep   01/24/25 09:48 EST

## 2025-04-11 RX ORDER — FAMOTIDINE 40 MG/1
40 TABLET, FILM COATED ORAL DAILY
Qty: 30 TABLET | Refills: 0 | Status: SHIPPED | OUTPATIENT
Start: 2025-04-11

## 2025-04-11 NOTE — TELEPHONE ENCOUNTER
If she still taking this? Per Sonia WHITE.      Called pt and pt reports that she is still taking the pepcid and does need a refill. Update sent to Sonia WHITE.

## 2025-06-20 ENCOUNTER — OFFICE VISIT (OUTPATIENT)
Dept: GASTROENTEROLOGY | Facility: CLINIC | Age: 45
End: 2025-06-20
Payer: COMMERCIAL

## 2025-06-20 VITALS
TEMPERATURE: 97.1 F | DIASTOLIC BLOOD PRESSURE: 84 MMHG | BODY MASS INDEX: 19.29 KG/M2 | HEART RATE: 81 BPM | HEIGHT: 66 IN | SYSTOLIC BLOOD PRESSURE: 111 MMHG | WEIGHT: 120 LBS

## 2025-06-20 DIAGNOSIS — R11.2 NAUSEA AND VOMITING, UNSPECIFIED VOMITING TYPE: Primary | ICD-10-CM

## 2025-06-20 DIAGNOSIS — K59.03 OPIOID-INDUCED CONSTIPATION: ICD-10-CM

## 2025-06-20 DIAGNOSIS — R10.9 ABDOMINAL PAIN, UNSPECIFIED ABDOMINAL LOCATION: ICD-10-CM

## 2025-06-20 DIAGNOSIS — T40.2X5A OPIOID-INDUCED CONSTIPATION: ICD-10-CM

## 2025-06-20 RX ORDER — PROMETHAZINE HYDROCHLORIDE 25 MG/1
1 TABLET ORAL 3 TIMES DAILY
COMMUNITY
Start: 2025-05-09

## 2025-06-20 RX ORDER — LUBIPROSTONE 8 UG/1
8 CAPSULE ORAL 2 TIMES DAILY WITH MEALS
Qty: 180 CAPSULE | Refills: 1 | Status: SHIPPED | OUTPATIENT
Start: 2025-06-20

## 2025-06-20 NOTE — PROGRESS NOTES
"Chief Complaint  Vomiting, Abdominal Pain, and Abdominal Cramping    Subjective          History of Present Illness    Belem Perez is a  44 y.o. female presents for follow-up.  She has history of GERD and IBS.  She is a patient of Dr. Jolly.  Last seen by me in the office 10/15/2024.    She has history of constipation - tried Linzess 72mcg, but this gave her diarrhea.     She has had worsening GERD. On pantoprazole 40mg bid which has been helping. She has also had  lot of vomiting - reports multiple episodes of \"food poisoning\". Vomited so much she had to go to the ED [facility] for fluids. Every time she eats she feels like she needs to vomit. Jaw clicks when she eats. Occasional BRBPR due to constipation. She has been taking promethazine to help keep her nausea at bay. LLQ discomfort frequently.  She reports that she always feels extremely sick after eating specifically baked or pork but tolerates other foods well.    Upper GI Endoscopy (01/06/2025 13:02) no gross lesions in the entire examined esophagus, erythematous mucosa in the stomach, no gross lesions in the examined duodenum.  Consider small bowel follow-through and possible gastric emptying study.  Pathology showed normal duodenal biopsies.  No evidence of H. pylori.    1/9/2024 colonoscopy showed 15 polyps throughout the colon. Diverticulosis, nonbleeding internal hemorrhoids. 1 polyp showed adenomatous change. F/u colonoscopy in 5 years.     Objective   Vital Signs:   /84 (BP Location: Left arm, Patient Position: Sitting, Cuff Size: Adult)   Pulse 81   Temp 97.1 °F (36.2 °C) (Temporal)   Ht 167.6 cm (66\")   Wt 54.4 kg (120 lb)   BMI 19.37 kg/m²       Physical Exam  Constitutional:       General: She is not in acute distress.     Appearance: Normal appearance.   Eyes:      General: No scleral icterus.  Pulmonary:      Effort: Pulmonary effort is normal.   Abdominal:      General: Abdomen is flat. There is no distension.      " Tenderness: There is no abdominal tenderness. There is no guarding.   Skin:     Coloration: Skin is not jaundiced.   Neurological:      General: No focal deficit present.      Mental Status: She is alert and oriented to person, place, and time.   Psychiatric:         Mood and Affect: Mood normal.         Behavior: Behavior normal.          Result Review :   The following data was reviewed by: Sonia Farrell PA-C on 06/20/2025:  CMP          1/6/2025    13:42   CMP   Glucose 96    BUN 11    Creatinine 0.82    EGFR 91.7    Sodium 139    Potassium 3.4    Chloride 103    Calcium 9.8    Total Protein 7.6    Albumin 4.5    Globulin 3.1    Total Bilirubin 0.5    Alkaline Phosphatase 65    AST (SGOT) 13    ALT (SGPT) 14    Albumin/Globulin Ratio 1.5    BUN/Creatinine Ratio 13.4    Anion Gap 12.7      CBC          9/12/2024    13:19 9/17/2024    11:12 1/6/2025    13:42   CBC   WBC 10.77     11.02     11.18    RBC 4.53     4.65     4.29    Hemoglobin 14.9     15.2     14.0    Hematocrit 43.6     44.3     42.7    MCV 96.2     95.3     99.5    MCH 32.9     32.7     32.6    MCHC 34.2     34.3     32.8    RDW 14.6     13.8     13.6    Platelets 327     357     323       Details          This result is from an external source.                     Assessment:   Diagnoses and all orders for this visit:    1. Nausea and vomiting, unspecified vomiting type (Primary)  -     NM Gastric Emptying; Future    2. Opioid-induced constipation  -     lubiprostone (Amitiza) 8 MCG capsule; Take 1 capsule by mouth 2 (Two) Times a Day With Meals.  Dispense: 180 capsule; Refill: 1    3. Abdominal pain, unspecified abdominal location  -     Alpha-Gal IgE Panel          Plan:   - Given ongoing nausea, abdominal discomfort and bloating recommend proceeding with gastric emptying scan.  -She is still quite constipated and this has been irritating her hemorrhoids.  Linzess was too strong-recommend trial of low-dose Amitiza.  We also discussed  minimizing pain medication as much as possible as this is likely contributing to her constipation and her nausea.  -She does seem to feel sick specifically after eating beef and pork more so than other food items so we will check alpha gal  -She has been eating a lot of fast food- advised to avoid greasy/fast foods and try low residue diet with multiple small meals throughout the day      Follow Up   Return in about 6 weeks (around 8/1/2025).    Dragon dictation used throughout this note.         Sonia Farrell PA-C  [facility]  [address] [place] [address]  Office: [phone]

## 2025-07-03 ENCOUNTER — HOSPITAL ENCOUNTER (OUTPATIENT)
Dept: NUCLEAR MEDICINE | Facility: HOSPITAL | Age: 45
Discharge: HOME | End: 2025-07-03
Payer: COMMERCIAL

## 2025-07-03 ENCOUNTER — TELEPHONE (OUTPATIENT)
Dept: GASTROENTEROLOGY | Facility: CLINIC | Age: 45
End: 2025-07-03

## 2025-07-03 ENCOUNTER — LAB (OUTPATIENT)
Dept: GASTROENTEROLOGY | Facility: CLINIC | Age: 45
End: 2025-07-03
Payer: COMMERCIAL

## 2025-07-03 DIAGNOSIS — K64.3 GRADE IV HEMORRHOIDS: ICD-10-CM

## 2025-07-03 DIAGNOSIS — K62.5 RECTAL BLEEDING: ICD-10-CM

## 2025-07-03 DIAGNOSIS — R11.2 NAUSEA AND VOMITING, UNSPECIFIED VOMITING TYPE: ICD-10-CM

## 2025-07-03 PROCEDURE — 34310000005 TECHNETIUM SULFUR COLLOID

## 2025-07-03 PROCEDURE — A9541 TC99M SULFUR COLLOID: HCPCS

## 2025-07-03 PROCEDURE — 78264 GASTRIC EMPTYING IMG STUDY: CPT

## 2025-07-03 RX ORDER — HYDROCORTISONE 25 MG/G
1 CREAM TOPICAL 2 TIMES DAILY
Qty: 30 G | Refills: 1 | Status: SHIPPED | OUTPATIENT
Start: 2025-07-03

## 2025-07-03 RX ADMIN — TECHNETIUM TC 99M SULFUR COLLOID 1 DOSE: KIT at 08:34

## 2025-07-03 NOTE — TELEPHONE ENCOUNTER
Pt came in office saying that she is in need of a refill for her hydrocortisone cream. Could we send that in for her please?

## 2025-07-08 LAB
ALPHA-GAL IGE QN: 0.3 KU/L
BEEF IGE QN: <0.1 KU/L
IGE SERPL-ACNC: 1342 IU/ML (ref 6–495)
LAMB IGE QN: <0.1 KU/L
PORK IGE QN: <0.1 KU/L
SERVICE CMNT-IMP: ABNORMAL

## 2025-07-15 ENCOUNTER — TELEPHONE (OUTPATIENT)
Dept: GASTROENTEROLOGY | Facility: CLINIC | Age: 45
End: 2025-07-15

## 2025-07-18 ENCOUNTER — OFFICE VISIT (OUTPATIENT)
Dept: GASTROENTEROLOGY | Facility: CLINIC | Age: 45
End: 2025-07-18
Payer: COMMERCIAL

## 2025-07-18 VITALS
BODY MASS INDEX: 18.98 KG/M2 | SYSTOLIC BLOOD PRESSURE: 135 MMHG | HEART RATE: 76 BPM | TEMPERATURE: 97.7 F | WEIGHT: 118.1 LBS | DIASTOLIC BLOOD PRESSURE: 86 MMHG | HEIGHT: 66 IN

## 2025-07-18 DIAGNOSIS — K31.84 GASTROPARESIS: ICD-10-CM

## 2025-07-18 DIAGNOSIS — K59.03 OPIOID-INDUCED CONSTIPATION: ICD-10-CM

## 2025-07-18 DIAGNOSIS — R10.9 ABDOMINAL PAIN, UNSPECIFIED ABDOMINAL LOCATION: Primary | ICD-10-CM

## 2025-07-18 DIAGNOSIS — Z91.014 ALPHA-GAL SYNDROME: ICD-10-CM

## 2025-07-18 DIAGNOSIS — K58.1 IRRITABLE BOWEL SYNDROME WITH CONSTIPATION: ICD-10-CM

## 2025-07-18 DIAGNOSIS — T40.2X5A OPIOID-INDUCED CONSTIPATION: ICD-10-CM

## 2025-07-18 NOTE — PROGRESS NOTES
"Chief Complaint  Nausea and Vomiting    Subjective          History of Present Illness    Belem Perez is a  44 y.o. female presents for follow-up.  She has a history of GERD and IBS.  She is a patient of Dr. Jolly.  Last seen by me in the office 6/20/2025.    She has a history of constipation-tried Linzess 72 mcg but this gave her diarrhea.  She was seen recently for ongoing abdominal pain, nausea, vomiting.  We discussed the results of her gastric emptying study today and her alpha-gal panel.  She does report that her symptoms are worse after consuming beef or pork.  She notes she does feel nauseous frequently.  She does still struggle with constipation intermittently.  Patient received a phone call during the office visit which lasted about 10 minutes.    Alpha-Gal IgE Panel (07/03/2025 09:00) appeared positive although specific IgE for pork, beef were within normal limits.    CT Abdomen Pelvis Without Contrast (07/11/2025 00:20) no acute inflammatory changes in abdomen or pelvis.    NM Gastric Emptying (07/03/2025 12:55) residual activity at 4 hours is 18% suggestive of delayed gastric emptying.    Upper GI Endoscopy (01/06/2025 13:02) no gross lesions in the entire examined esophagus, erythematous mucosa in the stomach, no gross lesions in the examined duodenum.  Consider small bowel follow-through and possible gastric emptying study.  Pathology showed normal duodenal biopsies.  No evidence of H. pylori.     1/9/2024 colonoscopy showed 15 polyps throughout the colon. Diverticulosis, nonbleeding internal hemorrhoids. 1 polyp showed adenomatous change. F/u colonoscopy in 5 years.     Objective   Vital Signs:   /86 (BP Location: Left arm, Patient Position: Sitting, Cuff Size: Adult)   Pulse 76   Temp 97.7 °F (36.5 °C) (Temporal)   Ht 167.6 cm (66\")   Wt 53.6 kg (118 lb 1.6 oz)   BMI 19.06 kg/m²       Physical Exam  Constitutional:       General: She is not in acute distress.     Appearance: Normal " appearance.   Eyes:      General: No scleral icterus.  Pulmonary:      Effort: Pulmonary effort is normal.   Skin:     Coloration: Skin is not jaundiced.   Neurological:      General: No focal deficit present.      Mental Status: She is alert and oriented to person, place, and time.   Psychiatric:         Mood and Affect: Mood normal.         Behavior: Behavior normal.          Result Review :   The following data was reviewed by: Sonia Farrell PA-C on 07/18/2025:  CMP          1/6/2025    13:42   CMP   Glucose 96    BUN 11    Creatinine 0.82    EGFR 91.7    Sodium 139    Potassium 3.4    Chloride 103    Calcium 9.8    Total Protein 7.6    Albumin 4.5    Globulin 3.1    Total Bilirubin 0.5    Alkaline Phosphatase 65    AST (SGOT) 13    ALT (SGPT) 14    Albumin/Globulin Ratio 1.5    BUN/Creatinine Ratio 13.4    Anion Gap 12.7      CBC          9/12/2024    13:19 9/17/2024    11:12 1/6/2025    13:42   CBC   WBC 10.77     11.02     11.18    RBC 4.53     4.65     4.29    Hemoglobin 14.9     15.2     14.0    Hematocrit 43.6     44.3     42.7    MCV 96.2     95.3     99.5    MCH 32.9     32.7     32.6    MCHC 34.2     34.3     32.8    RDW 14.6     13.8     13.6    Platelets 327     357     323       Details          This result is from an external source.                     Assessment:   Diagnoses and all orders for this visit:    1. Abdominal pain, unspecified abdominal location (Primary)    2. Gastroparesis  -     Ambulatory Referral to Nutrition Services    3. Alpha-gal syndrome  -     Ambulatory Referral to Nutrition Services    4. Opioid-induced constipation    5. Irritable bowel syndrome with constipation          Plan:   - We discussed the results of her alpha gal test.  Results are not incredibly clear as alpha gal appears positive but IgE related to beef, pork and lamb are all negative. Discussing w/ Dr. Jolly.  She does tend to have a flare in her symptoms when consuming red meat so advised that she avoid  beef pork and lamb going forward.  -Also discussed gastroparesis at length.  Recommend multiple small meals throughout the day.  Continue PPI.  I have placed a referral to nutrition as well.  -Amitiza was too strong-can use MiraLAX daily to help regulate bowel movements.      Follow Up   Return in about 4 weeks (around 8/15/2025).    Dragon dictation used throughout this note.         Sonia Farrell PA-C  [facility]  [address] [place] [address]  Office: [phone]

## 2025-07-24 ENCOUNTER — TELEPHONE (OUTPATIENT)
Dept: GASTROENTEROLOGY | Facility: CLINIC | Age: 45
End: 2025-07-24

## 2025-08-11 ENCOUNTER — OFFICE VISIT (OUTPATIENT)
Dept: SURGERY | Facility: CLINIC | Age: 45
End: 2025-08-11
Payer: COMMERCIAL

## 2025-08-11 VITALS
BODY MASS INDEX: 18.16 KG/M2 | HEART RATE: 85 BPM | WEIGHT: 113 LBS | SYSTOLIC BLOOD PRESSURE: 120 MMHG | OXYGEN SATURATION: 99 % | HEIGHT: 66 IN | DIASTOLIC BLOOD PRESSURE: 90 MMHG

## 2025-08-11 DIAGNOSIS — K64.8 INTERNAL HEMORRHOIDS WITH COMPLICATION: Primary | ICD-10-CM

## 2025-08-11 PROCEDURE — 99213 OFFICE O/P EST LOW 20 MIN: CPT | Performed by: PHYSICIAN ASSISTANT

## 2025-08-11 PROCEDURE — 1159F MED LIST DOCD IN RCRD: CPT | Performed by: PHYSICIAN ASSISTANT

## 2025-08-11 PROCEDURE — 1160F RVW MEDS BY RX/DR IN RCRD: CPT | Performed by: PHYSICIAN ASSISTANT

## 2025-08-11 RX ORDER — POTASSIUM CHLORIDE 1500 MG/1
20 TABLET, EXTENDED RELEASE ORAL DAILY
COMMUNITY
Start: 2025-07-17

## 2025-08-11 RX ORDER — LEVOTHYROXINE SODIUM 100 MCG
100 TABLET ORAL DAILY
COMMUNITY
Start: 2025-07-24

## 2025-08-11 RX ORDER — ONDANSETRON 8 MG/1
8 TABLET, ORALLY DISINTEGRATING ORAL 3 TIMES DAILY PRN
COMMUNITY
Start: 2025-07-17

## (undated) DEVICE — FRCP BX RADJAW4 NDL 2.8 240CM LG OG BX40

## (undated) DEVICE — KT ORCA ORCAPOD DISP STRL

## (undated) DEVICE — CANN O2 ETCO2 FITS ALL CONN CO2 SMPL A/ 7IN DISP LF

## (undated) DEVICE — SENSR O2 OXIMAX FNGR A/ 18IN NONSTR

## (undated) DEVICE — ADAPT CLN BIOGUARD AIR/H2O DISP

## (undated) DEVICE — BLCK/BITE BLOX W/DENTL/RIM W/STRAP 54F

## (undated) DEVICE — TUBING, SUCTION, 1/4" X 10', STRAIGHT: Brand: MEDLINE

## (undated) DEVICE — THE SINGLE USE ETRAP – POLYP TRAP IS USED FOR SUCTION RETRIEVAL OF ENDOSCOPICALLY REMOVED POLYPS.: Brand: ETRAP

## (undated) DEVICE — LN SMPL CO2 SHTRM SD STREAM W/M LUER

## (undated) DEVICE — SNAR POLYP CAPTIVATOR RND STFF 2.4 240CM 10MM 1P/U

## (undated) DEVICE — SINGLE-USE BIOPSY FORCEPS: Brand: RADIAL JAW 4